# Patient Record
Sex: FEMALE | Race: WHITE | Employment: OTHER | URBAN - METROPOLITAN AREA
[De-identification: names, ages, dates, MRNs, and addresses within clinical notes are randomized per-mention and may not be internally consistent; named-entity substitution may affect disease eponyms.]

---

## 2022-08-04 ENCOUNTER — TRANSCRIBE ORDER (OUTPATIENT)
Dept: SCHEDULING | Age: 82
End: 2022-08-04

## 2022-08-04 DIAGNOSIS — M81.0 SENILE OSTEOPOROSIS: Primary | ICD-10-CM

## 2022-08-04 DIAGNOSIS — F17.210 NICOTINE DEPENDENCE, CIGARETTES, UNCOMPLICATED: ICD-10-CM

## 2022-08-04 DIAGNOSIS — R63.4 ABNORMAL WEIGHT LOSS: ICD-10-CM

## 2022-08-04 DIAGNOSIS — M81.0 AGE-RELATED OSTEOPOROSIS WITHOUT CURRENT PATHOLOGICAL FRACTURE: ICD-10-CM

## 2022-08-04 DIAGNOSIS — C50.812 MALIGNANT NEOPLASM OF OVERLAPPING SITES OF LEFT FEMALE BREAST (HCC): ICD-10-CM

## 2022-08-09 ENCOUNTER — HOSPITAL ENCOUNTER (OUTPATIENT)
Dept: CT IMAGING | Age: 82
Discharge: HOME | End: 2022-08-09
Payer: MEDICARE

## 2022-08-09 ENCOUNTER — HOSPITAL ENCOUNTER (OUTPATIENT)
Dept: MAMMOGRAPHY | Age: 82
Discharge: HOME | End: 2022-08-09
Payer: MEDICARE

## 2022-08-09 DIAGNOSIS — C50.812 MALIGNANT NEOPLASM OF OVERLAPPING SITES OF LEFT FEMALE BREAST (HCC): ICD-10-CM

## 2022-08-09 DIAGNOSIS — R63.4 ABNORMAL WEIGHT LOSS: ICD-10-CM

## 2022-08-09 DIAGNOSIS — F17.210 NICOTINE DEPENDENCE, CIGARETTES, UNCOMPLICATED: ICD-10-CM

## 2022-08-09 DIAGNOSIS — M81.0 AGE-RELATED OSTEOPOROSIS WITHOUT CURRENT PATHOLOGICAL FRACTURE: ICD-10-CM

## 2022-08-09 PROCEDURE — 77080 DXA BONE DENSITY AXIAL: CPT

## 2022-08-09 PROCEDURE — 71250 CT THORAX DX C-: CPT

## 2023-04-06 ENCOUNTER — OFFICE VISIT (OUTPATIENT)
Facility: CLINIC | Age: 83
End: 2023-04-06
Payer: MEDICARE

## 2023-04-06 VITALS
HEART RATE: 71 BPM | RESPIRATION RATE: 15 BRPM | DIASTOLIC BLOOD PRESSURE: 72 MMHG | SYSTOLIC BLOOD PRESSURE: 160 MMHG | WEIGHT: 103 LBS | TEMPERATURE: 97.9 F | OXYGEN SATURATION: 96 %

## 2023-04-06 DIAGNOSIS — Z72.0 TOBACCO USE: ICD-10-CM

## 2023-04-06 DIAGNOSIS — I10 PRIMARY HYPERTENSION: Primary | ICD-10-CM

## 2023-04-06 DIAGNOSIS — E03.9 HYPOTHYROIDISM, UNSPECIFIED TYPE: ICD-10-CM

## 2023-04-06 DIAGNOSIS — Z86.79 HX OF CHRONIC CONGESTIVE HEART FAILURE: ICD-10-CM

## 2023-04-06 DIAGNOSIS — I73.9 PERIPHERAL VASCULAR DISEASE (HCC): ICD-10-CM

## 2023-04-06 DIAGNOSIS — Z13.220 SCREENING FOR CHOLESTEROL LEVEL: ICD-10-CM

## 2023-04-06 PROCEDURE — 1123F ACP DISCUSS/DSCN MKR DOCD: CPT | Performed by: NURSE PRACTITIONER

## 2023-04-06 PROCEDURE — 99203 OFFICE O/P NEW LOW 30 MIN: CPT | Performed by: NURSE PRACTITIONER

## 2023-04-06 PROCEDURE — 4004F PT TOBACCO SCREEN RCVD TLK: CPT | Performed by: NURSE PRACTITIONER

## 2023-04-06 PROCEDURE — G8399 PT W/DXA RESULTS DOCUMENT: HCPCS | Performed by: NURSE PRACTITIONER

## 2023-04-06 PROCEDURE — G8421 BMI NOT CALCULATED: HCPCS | Performed by: NURSE PRACTITIONER

## 2023-04-06 PROCEDURE — 3077F SYST BP >= 140 MM HG: CPT | Performed by: NURSE PRACTITIONER

## 2023-04-06 PROCEDURE — 3078F DIAST BP <80 MM HG: CPT | Performed by: NURSE PRACTITIONER

## 2023-04-06 PROCEDURE — G8427 DOCREV CUR MEDS BY ELIG CLIN: HCPCS | Performed by: NURSE PRACTITIONER

## 2023-04-06 PROCEDURE — 1090F PRES/ABSN URINE INCON ASSESS: CPT | Performed by: NURSE PRACTITIONER

## 2023-04-06 RX ORDER — METOPROLOL SUCCINATE 25 MG/1
25 TABLET, EXTENDED RELEASE ORAL DAILY
COMMUNITY
Start: 2023-01-24

## 2023-04-06 RX ORDER — LEVOTHYROXINE SODIUM 0.03 MG/1
25 TABLET ORAL DAILY
COMMUNITY
Start: 2023-03-21

## 2023-04-06 RX ORDER — LISINOPRIL 10 MG/1
10 TABLET ORAL DAILY
COMMUNITY
Start: 2023-02-19 | End: 2023-04-06 | Stop reason: SDUPTHER

## 2023-04-06 RX ORDER — TAMOXIFEN CITRATE 20 MG/1
TABLET ORAL
COMMUNITY
Start: 2023-03-21

## 2023-04-06 RX ORDER — OXYBUTYNIN CHLORIDE 5 MG/1
2.5 TABLET ORAL DAILY
COMMUNITY
Start: 2023-03-21

## 2023-04-06 RX ORDER — LISINOPRIL 20 MG/1
20 TABLET ORAL DAILY
Qty: 90 TABLET | Refills: 0 | Status: SHIPPED | OUTPATIENT
Start: 2023-04-06 | End: 2023-07-05

## 2023-04-06 SDOH — ECONOMIC STABILITY: FOOD INSECURITY: WITHIN THE PAST 12 MONTHS, THE FOOD YOU BOUGHT JUST DIDN'T LAST AND YOU DIDN'T HAVE MONEY TO GET MORE.: NEVER TRUE

## 2023-04-06 SDOH — ECONOMIC STABILITY: INCOME INSECURITY: HOW HARD IS IT FOR YOU TO PAY FOR THE VERY BASICS LIKE FOOD, HOUSING, MEDICAL CARE, AND HEATING?: NOT HARD AT ALL

## 2023-04-06 SDOH — ECONOMIC STABILITY: HOUSING INSECURITY
IN THE LAST 12 MONTHS, WAS THERE A TIME WHEN YOU DID NOT HAVE A STEADY PLACE TO SLEEP OR SLEPT IN A SHELTER (INCLUDING NOW)?: NO

## 2023-04-06 SDOH — ECONOMIC STABILITY: FOOD INSECURITY: WITHIN THE PAST 12 MONTHS, YOU WORRIED THAT YOUR FOOD WOULD RUN OUT BEFORE YOU GOT MONEY TO BUY MORE.: NEVER TRUE

## 2023-04-06 ASSESSMENT — PATIENT HEALTH QUESTIONNAIRE - PHQ9
SUM OF ALL RESPONSES TO PHQ QUESTIONS 1-9: 0
SUM OF ALL RESPONSES TO PHQ9 QUESTIONS 1 & 2: 0
1. LITTLE INTEREST OR PLEASURE IN DOING THINGS: 0
SUM OF ALL RESPONSES TO PHQ QUESTIONS 1-9: 0
2. FEELING DOWN, DEPRESSED OR HOPELESS: 0

## 2023-04-06 NOTE — PROGRESS NOTES
Nancy Mccracken presents today for   Chief Complaint   Patient presents with    New Patient     New patient establish care with provider        Is someone accompanying this pt? Yes daughter    Is the patient using any DME equipment during OV? Yes walker     Health Maintenance reviewed and discussed and ordered per Provider. Health Maintenance Due   Topic Date Due    COVID-19 Vaccine (1) Never done    Depression Screen  Never done    DTaP/Tdap/Td vaccine (1 - Tdap) Never done    Shingles vaccine (1 of 2) Never done    Pneumococcal 65+ years Vaccine (1 - PCV) Never done    Annual Wellness Visit (AWV)  03/15/2023   . 1. \"Have you been to the ER, urgent care clinic since your last visit? Hospitalized since your last visit? \" No    2. \"Have you seen or consulted any other health care providers outside of the [address] [place] [address] since your last visit? \" Yes 3/2023 [place]     3. For patients aged 39-70: Has the patient had a colonoscopy / FIT/ Cologuard? NA - based on age      If the patient is female:    4. For patients aged 41-77: Has the patient had a mammogram within the past 2 years? NA - based on age or sex      11. For patients aged 21-65: Has the patient had a pap smear?  NA - based on age or sex
for rash  Neurological:  Negative for fainting,dizziness, weakness, headaches,numbness       Current Outpatient Medications   Medication Sig    levothyroxine (SYNTHROID) 25 MCG tablet Take 1 tablet by mouth daily    metoprolol succinate (TOPROL XL) 25 MG extended release tablet Take 1 tablet by mouth daily    oxybutynin (DITROPAN) 5 MG tablet Take 0.5 tablets by mouth daily    tamoxifen (NOLVADEX) 20 MG tablet TAKE 1 TABLET BY MOUTH DAILY. SWALLOW WHOLE WITH WATER    lisinopril (PRINIVIL;ZESTRIL) 20 MG tablet Take 1 tablet by mouth daily     No current facility-administered medications for this visit. BP (!) 160/72   Pulse 71   Temp 97.9 °F (36.6 °C)   Resp 15   Wt 103 lb (46.7 kg)   SpO2 96%            General:  alert, cooperative, well appearing, in no apparent distress. Eyes:  Pupils are equally round and reactive to light with accommodation. ENT:    The tongue and mucous membranes are pink and moist without lesions. The pharynx is non-erythematous without exudates. Neck:  There is normal range of motion. The thyroid is normal size without nodules or masses. There is no lymphadenopathy. CV:  The heart sounds are regular in rate and rhythm. There is a normal S1 and S2. Heart  sounds are distant   Lungs: Inspiratory and expiratory efforts are patient and unlabored. Lung sounds are  diminished and equal to auscultation throughout all lung fields without wheezing, rales, or rhonchi. Extremities: There is no clubbing, there is skin color changes noted to the bilateral feet both feet are equally cool to touch with purplish hue and capillary refill greater than 3 seconds. ,  There is no edema. Pulses are palpable. Neurological:    There is no obvious focal sensory or motor deficits. An electronic signature was used to authenticate this note.   -- TRISHA Negro

## 2023-08-17 ENCOUNTER — OFFICE VISIT (OUTPATIENT)
Facility: CLINIC | Age: 83
End: 2023-08-17
Payer: MEDICARE

## 2023-08-17 VITALS
WEIGHT: 105 LBS | RESPIRATION RATE: 14 BRPM | TEMPERATURE: 98.1 F | HEART RATE: 75 BPM | OXYGEN SATURATION: 98 % | SYSTOLIC BLOOD PRESSURE: 183 MMHG | DIASTOLIC BLOOD PRESSURE: 100 MMHG

## 2023-08-17 DIAGNOSIS — M79.641 PAIN IN BOTH HANDS: ICD-10-CM

## 2023-08-17 DIAGNOSIS — M79.642 PAIN IN BOTH HANDS: ICD-10-CM

## 2023-08-17 DIAGNOSIS — M25.541 ARTHRALGIA OF HANDS, BILATERAL: Primary | ICD-10-CM

## 2023-08-17 DIAGNOSIS — M25.542 ARTHRALGIA OF HANDS, BILATERAL: Primary | ICD-10-CM

## 2023-08-17 DIAGNOSIS — M24.541 CONTRACTURE OF JOINT OF FINGER OF RIGHT HAND: ICD-10-CM

## 2023-08-17 PROBLEM — C50.919 PRIMARY MALIGNANT NEOPLASM OF FEMALE BREAST (HCC): Status: ACTIVE | Noted: 2023-08-17

## 2023-08-17 PROBLEM — I10 HYPERTENSION: Status: ACTIVE | Noted: 2023-08-17

## 2023-08-17 PROBLEM — R63.4 ABNORMAL WEIGHT LOSS: Status: ACTIVE | Noted: 2023-08-17

## 2023-08-17 PROBLEM — T50.905A HOT FLASH DUE TO MEDICATION: Status: ACTIVE | Noted: 2023-08-17

## 2023-08-17 PROBLEM — R23.2 HOT FLASH DUE TO MEDICATION: Status: ACTIVE | Noted: 2023-08-17

## 2023-08-17 PROBLEM — M81.0 OSTEOPOROSIS: Status: ACTIVE | Noted: 2023-08-17

## 2023-08-17 PROCEDURE — G8399 PT W/DXA RESULTS DOCUMENT: HCPCS | Performed by: NURSE PRACTITIONER

## 2023-08-17 PROCEDURE — G8421 BMI NOT CALCULATED: HCPCS | Performed by: NURSE PRACTITIONER

## 2023-08-17 PROCEDURE — 3077F SYST BP >= 140 MM HG: CPT | Performed by: NURSE PRACTITIONER

## 2023-08-17 PROCEDURE — 4004F PT TOBACCO SCREEN RCVD TLK: CPT | Performed by: NURSE PRACTITIONER

## 2023-08-17 PROCEDURE — 1123F ACP DISCUSS/DSCN MKR DOCD: CPT | Performed by: NURSE PRACTITIONER

## 2023-08-17 PROCEDURE — 3080F DIAST BP >= 90 MM HG: CPT | Performed by: NURSE PRACTITIONER

## 2023-08-17 PROCEDURE — 1090F PRES/ABSN URINE INCON ASSESS: CPT | Performed by: NURSE PRACTITIONER

## 2023-08-17 PROCEDURE — 99213 OFFICE O/P EST LOW 20 MIN: CPT | Performed by: NURSE PRACTITIONER

## 2023-08-17 PROCEDURE — G8427 DOCREV CUR MEDS BY ELIG CLIN: HCPCS | Performed by: NURSE PRACTITIONER

## 2023-08-17 SDOH — ECONOMIC STABILITY: FOOD INSECURITY: WITHIN THE PAST 12 MONTHS, YOU WORRIED THAT YOUR FOOD WOULD RUN OUT BEFORE YOU GOT MONEY TO BUY MORE.: NEVER TRUE

## 2023-08-17 SDOH — ECONOMIC STABILITY: FOOD INSECURITY: WITHIN THE PAST 12 MONTHS, THE FOOD YOU BOUGHT JUST DIDN'T LAST AND YOU DIDN'T HAVE MONEY TO GET MORE.: NEVER TRUE

## 2023-08-17 SDOH — ECONOMIC STABILITY: INCOME INSECURITY: HOW HARD IS IT FOR YOU TO PAY FOR THE VERY BASICS LIKE FOOD, HOUSING, MEDICAL CARE, AND HEATING?: NOT HARD AT ALL

## 2023-08-17 ASSESSMENT — PATIENT HEALTH QUESTIONNAIRE - PHQ9
2. FEELING DOWN, DEPRESSED OR HOPELESS: 0
SUM OF ALL RESPONSES TO PHQ QUESTIONS 1-9: 0
1. LITTLE INTEREST OR PLEASURE IN DOING THINGS: 0
SUM OF ALL RESPONSES TO PHQ QUESTIONS 1-9: 0
SUM OF ALL RESPONSES TO PHQ9 QUESTIONS 1 & 2: 0
SUM OF ALL RESPONSES TO PHQ QUESTIONS 1-9: 0
SUM OF ALL RESPONSES TO PHQ QUESTIONS 1-9: 0

## 2023-08-17 NOTE — PROGRESS NOTES
Maribel Andreia presents today for   Chief Complaint   Patient presents with    Hand Pain     Has some hand pain and swelling        Is someone accompanying this pt? Yes her daughter     Is the patient using any DME equipment during OV? Yes walker     Health Maintenance reviewed and discussed and ordered per Provider. Health Maintenance Due   Topic Date Due    COVID-19 Vaccine (1) Never done    Pneumococcal 65+ years Vaccine (1 - PCV) Never done    DTaP/Tdap/Td vaccine (1 - Tdap) Never done    Shingles vaccine (1 of 2) Never done    Low dose CT lung screening &/or counseling  Never done    Annual Wellness Visit (AWV)  Never done    Flu vaccine (1) Never done   . 1. \"Have you been to the ER, urgent care clinic since your last visit? Hospitalized since your last visit? \" No    2. \"Have you seen or consulted any other health care providers outside of the [address] [place] [address] since your last visit? \" Yes When: 5/2023 Where: Oncology  Reason for visit: follow up visit       3. For patients aged 43-73: Has the patient had a colonoscopy / FIT/ Cologuard? NA - based on age      If the patient is female:    4. For patients aged 43-66: Has the patient had a mammogram within the past 2 years? NA - based on age or sex      11. For patients aged 21-65: Has the patient had a pap smear?  NA - based on age or sex
Patient states when she was 34years old she had a severe left hand injury where her left hand was crushed in a press machine. She suffered fractures, had surgeries, she has her second left finger amputated, she is not able to do much or use this left hand and this is started when she was 34years old. Patient was left-handed at the time she states that she has learned to use her right hand. Patient's right hand is been bothering her she states she uses it a lot and has to take up the slack due to the left hands inability, but she says that the right hand has been intermittently swollen, it cramps, and gets severely painful. Also noted that last week the left hand had swelling hot and redness to it which is now dissipated  Did have lab work and they are going to get this med labs to be so I can evaluate  Redness and swelling of calm down but the patient is worried about the pain, she uses Aspercreme as needed. Wondering if there is any other topicals that could benefit  Consider seeing a specialist to see if there is any options such as injections. Noted the left fifth finger is amputated also states she had an extreme infection in this when she had hand surgery  Patient does continue to smoke cigarettes  No report today of fever, chills, visual disturbances, cough or shortness of breath, chest pain or abdominal pain, nausea or vomiting, dizziness or weakness or fainting. ROS:  Constitutional: Negative for fever, chills ,fatigue, unexplained weight gain/loss  HENT:  Negative for ear pain,postnasal drip, rhinitis,  Eyes:  Negative for visual disturbance,   Respiratory:  Negative for cough and shortness of breath,wheezing ,congestion  Cardiovascular:  Negative for chest pain, palpitations and leg swelling. Gastrointestinal:  Negative for abdominal pain, constipation, diarrhea, nausea ,vomiting. Musculoskeletal:  Negative for arthralgias, back pain and gait problem.  Joint pain, muscle

## 2023-09-28 ENCOUNTER — OFFICE VISIT (OUTPATIENT)
Facility: CLINIC | Age: 83
End: 2023-09-28
Payer: MEDICARE

## 2023-09-28 ENCOUNTER — NURSE TRIAGE (OUTPATIENT)
Dept: OTHER | Facility: CLINIC | Age: 83
End: 2023-09-28

## 2023-09-28 VITALS
OXYGEN SATURATION: 97 % | SYSTOLIC BLOOD PRESSURE: 144 MMHG | TEMPERATURE: 97.7 F | RESPIRATION RATE: 16 BRPM | HEART RATE: 80 BPM | DIASTOLIC BLOOD PRESSURE: 83 MMHG

## 2023-09-28 DIAGNOSIS — C50.919 PRIMARY MALIGNANT NEOPLASM OF FEMALE BREAST (HCC): ICD-10-CM

## 2023-09-28 DIAGNOSIS — I73.9 PERIPHERAL VASCULAR DISEASE (HCC): ICD-10-CM

## 2023-09-28 DIAGNOSIS — J20.9 ACUTE BRONCHITIS, UNSPECIFIED ORGANISM: Primary | ICD-10-CM

## 2023-09-28 DIAGNOSIS — I10 PRIMARY HYPERTENSION: ICD-10-CM

## 2023-09-28 DIAGNOSIS — Z72.0 TOBACCO USE: ICD-10-CM

## 2023-09-28 PROBLEM — M81.8 OTHER OSTEOPOROSIS WITHOUT CURRENT PATHOLOGICAL FRACTURE: Status: ACTIVE | Noted: 2023-09-28

## 2023-09-28 PROCEDURE — G8399 PT W/DXA RESULTS DOCUMENT: HCPCS | Performed by: NURSE PRACTITIONER

## 2023-09-28 PROCEDURE — 1123F ACP DISCUSS/DSCN MKR DOCD: CPT | Performed by: NURSE PRACTITIONER

## 2023-09-28 PROCEDURE — 1090F PRES/ABSN URINE INCON ASSESS: CPT | Performed by: NURSE PRACTITIONER

## 2023-09-28 PROCEDURE — G8427 DOCREV CUR MEDS BY ELIG CLIN: HCPCS | Performed by: NURSE PRACTITIONER

## 2023-09-28 PROCEDURE — 99213 OFFICE O/P EST LOW 20 MIN: CPT | Performed by: NURSE PRACTITIONER

## 2023-09-28 PROCEDURE — 4004F PT TOBACCO SCREEN RCVD TLK: CPT | Performed by: NURSE PRACTITIONER

## 2023-09-28 PROCEDURE — G8421 BMI NOT CALCULATED: HCPCS | Performed by: NURSE PRACTITIONER

## 2023-09-28 PROCEDURE — 3079F DIAST BP 80-89 MM HG: CPT | Performed by: NURSE PRACTITIONER

## 2023-09-28 PROCEDURE — 3077F SYST BP >= 140 MM HG: CPT | Performed by: NURSE PRACTITIONER

## 2023-09-28 RX ORDER — DOXYCYCLINE HYCLATE 100 MG
100 TABLET ORAL 2 TIMES DAILY
Qty: 14 TABLET | Refills: 0 | Status: SHIPPED | OUTPATIENT
Start: 2023-09-28 | End: 2023-10-05

## 2023-09-28 ASSESSMENT — PATIENT HEALTH QUESTIONNAIRE - PHQ9
SUM OF ALL RESPONSES TO PHQ9 QUESTIONS 1 & 2: 0
SUM OF ALL RESPONSES TO PHQ QUESTIONS 1-9: 0
SUM OF ALL RESPONSES TO PHQ QUESTIONS 1-9: 0
1. LITTLE INTEREST OR PLEASURE IN DOING THINGS: 0
SUM OF ALL RESPONSES TO PHQ QUESTIONS 1-9: 0
2. FEELING DOWN, DEPRESSED OR HOPELESS: 0
SUM OF ALL RESPONSES TO PHQ QUESTIONS 1-9: 0

## 2023-09-28 NOTE — TELEPHONE ENCOUNTER
[facility]    Received call from [facility] at [facility] with [employer]. Subjective: Caller states \"About a week ago she was feeling really weak. Maybe a little loss of appetite. Right now her symptoms are she's coughing more, it's in her chest. Her blood pressure has been high or low. I don't see a temperature. She said she was dizzy, lightheaded, fatigue. Her pupils look dilated to me. She says she feels a little bit better than yesterday. \" \"I called yesterday and they set her up an appointment for this afternoon, I just wanted to move it to 1130. \"    Current Symptoms: dizziness-NOT now, cough, chest congestion-improving/chest soreness with coughing ONLY, SOB with coughing-NO difficulty breathing, fatigue, pupils dilated    Hx of CHF, COPD    Hx of Pneumonia and similar symptoms    Onset: 1 week ago; waxing and waning    Associated Symptoms: reduced activity    Pain Severity: Denies    Temperature: Denies    What has been tried: Nothing    LMP: NA Pregnant: NA    Recommended disposition: See in Office Today. Caller agreeable. Care advice provided, patient verbalizes understanding; denies any other questions or concerns; instructed to call back for any new or worsening symptoms. Patient/Caller agrees with recommended disposition; writer provided warm transfer to [employer] at [facility] for appointment scheduling. Attention Provider: Thank you for allowing me to participate in the care of your patient. The patient was connected to triage in response to information provided to the ECC/PSC. Please do not respond through this encounter as the response is not directed to a shared pool.       Reason for Disposition   Known COPD or other severe lung disease (i.e., bronchiectasis, cystic fibrosis, lung surgery) and worsening symptoms (i.e., increased sputum purulence or amount, increased breathing difficulty)    Protocols used: Cough-ADULT-OH

## 2023-09-28 NOTE — PROGRESS NOTES
Dustin Cobos presents today for   Chief Complaint   Patient presents with    Cough     Has a cough and congestion for about a week now no fever BP been up and down has been weak tired lightheadedness        Is someone accompanying this pt? Yes her daughter     Is the patient using any DME equipment during OV? Yes walker    Health Maintenance reviewed and discussed and ordered per Provider. Health Maintenance Due   Topic Date Due    COVID-19 Vaccine (1) Never done    Pneumococcal 65+ years Vaccine (1 - PCV) Never done    DTaP/Tdap/Td vaccine (1 - Tdap) Never done    Shingles vaccine (1 of 2) Never done    Annual Wellness Visit (AWV)  Never done    Flu vaccine (1) Never done   . 1. \"Have you been to the ER, urgent care clinic since your last visit? Hospitalized since your last visit? \" No    2. \"Have you seen or consulted any other health care providers outside of the [address] [place] [address] since your last visit? \" No     3. For patients aged 43-73: Has the patient had a colonoscopy / FIT/ Cologuard? NA - based on age      If the patient is female:    4. For patients aged 43-66: Has the patient had a mammogram within the past 2 years? NA - based on age or sex      11. For patients aged 21-65: Has the patient had a pap smear?  NA - based on age or sex

## 2023-09-28 NOTE — PROGRESS NOTES
Maribel Boswell (: 1940) is a 82 y.o. female patient, here for evaluation of the following chief complaint(s):  Cough (Has a cough and congestion for about a week now no fever BP been up and down has been weak tired lightheadedness )       ASSESSMENT/PLAN:  Below is the assessment and plan developed based on review of pertinent history, physical exam, labs, studies, and medications. #1: Antibiotic as prescribed  Chest x-ray  Patient may use over-the-counter medication such as Mucinex with guaifenesin. Go to the emergency room for any severe symptoms such as shortness of breath  Blood pressure is mildly mildly elevated in the office today. Patient has not smoked yesterday, will continue to smoke not ready to quit. #5: [address] code. No changes in the perforations apparent peripheral vascular disease upon exam the patient does not want to follow with specialty care we will continue to monitor closely    Follow-up in the office for any new or worsening symptoms ER for severe symptoms    1. Acute bronchitis, unspecified organism  -     doxycycline hyclate (VIBRA-TABS) 100 MG tablet; Take 1 tablet by mouth 2 times daily for 7 days, Disp-14 tablet, R-0Normal  -     XR CHEST (2 VIEWS); Future  2. Primary malignant neoplasm of female breast ([address])  3. Primary hypertension  4. Tobacco use  5. Peripheral vascular disease ([address])    No results found for any visits on 23. Return in about 1 week (around 10/5/2023). I have discussed the diagnosis with the patient and the intended plan as seen in the above orders. Questions were answered concerning future plans. I have discussed medication side effects and warnings with the patient as well. I have reviewed the plan of care with the patient, accepted their input and they are in agreement with the treatment goals. Previous lab and imaging results were reviewed by me.      I have performed a medically appropriate exam, ordering tests and medications, counseling

## 2023-09-29 ENCOUNTER — TELEPHONE (OUTPATIENT)
Facility: CLINIC | Age: 83
End: 2023-09-29

## 2023-09-29 ENCOUNTER — HOSPITAL ENCOUNTER (OUTPATIENT)
Age: 83
End: 2023-09-29
Payer: MEDICARE

## 2023-09-29 DIAGNOSIS — J20.9 ACUTE BRONCHITIS, UNSPECIFIED ORGANISM: ICD-10-CM

## 2023-09-29 PROCEDURE — 71046 X-RAY EXAM CHEST 2 VIEWS: CPT

## 2023-10-02 ENCOUNTER — TELEPHONE (OUTPATIENT)
Facility: CLINIC | Age: 83
End: 2023-10-02

## 2023-10-02 NOTE — TELEPHONE ENCOUNTER
Daughter called again about x ray results. Guanakito Robersoned also that she hasn't been able to take antibiotic because it has been upsetting he stomach. Please advise.

## 2023-10-02 NOTE — TELEPHONE ENCOUNTER
Called and notified patient's daughter Padmini Bass of patient [place] [facility] results she understood no further questions nor concerns

## 2024-01-24 DIAGNOSIS — I10 PRIMARY HYPERTENSION: ICD-10-CM

## 2024-01-24 NOTE — TELEPHONE ENCOUNTER
Last visit 9/28/23, no scheduled follow up.  Per [facility] refill protocol, no creatinine or potassium levels checked in past 12 months.

## 2024-01-25 DIAGNOSIS — I10 PRIMARY HYPERTENSION: ICD-10-CM

## 2024-01-25 RX ORDER — LISINOPRIL 20 MG/1
20 TABLET ORAL DAILY
Qty: 30 TABLET | Refills: 0 | Status: SHIPPED | OUTPATIENT
Start: 2024-01-25 | End: 2024-02-24

## 2024-01-25 RX ORDER — LISINOPRIL 20 MG/1
20 TABLET ORAL DAILY
Qty: 90 TABLET | OUTPATIENT
Start: 2024-01-25 | End: 2024-02-24

## 2024-03-06 DIAGNOSIS — I10 PRIMARY HYPERTENSION: ICD-10-CM

## 2024-03-07 DIAGNOSIS — I10 PRIMARY HYPERTENSION: ICD-10-CM

## 2024-03-07 RX ORDER — LISINOPRIL 20 MG/1
20 TABLET ORAL DAILY
Qty: 30 TABLET | Refills: 0 | Status: SHIPPED | OUTPATIENT
Start: 2024-03-07 | End: 2024-04-06

## 2024-03-07 RX ORDER — LISINOPRIL 20 MG/1
20 TABLET ORAL DAILY
Qty: 90 TABLET | OUTPATIENT
Start: 2024-03-07 | End: 2024-04-06

## 2024-03-07 NOTE — TELEPHONE ENCOUNTER
Last appointment was 9/28/2023 and she has no follow up scheduled. Please advise on continued refills, when would you like patient to follow up. Thank You.

## 2024-03-07 NOTE — TELEPHONE ENCOUNTER
Spoke with daughter and she states that about 1 week ago had food poisoning, vomiting and diarrhea for like 2 or 3 days. She was weak and daughter was giving her bland foods. Last night she said patient was constipated and couldn't poop so she took some stool softener medication and had a bowel movement. She has been eating a little today. So at this time back pain and weakness, stomach is feeling \"funny\". Spoke to patient and she states that she is at this time she has no appetite and is feeling weak and tired and her stomach stays bloated all the time, back pain up in between her shoulder blades, was having some neck pain. No urinary problems, no fevers. States she gets hot and cold. States she just doesn't feel right since she got food poisoning. No energy. Scheduled patient for tomorrow at 11 AM

## 2024-03-22 ENCOUNTER — HOSPITAL ENCOUNTER (EMERGENCY)
Age: 84
Discharge: HOME | End: 2024-03-22
Attending: EMERGENCY MEDICINE
Payer: MEDICARE

## 2024-03-22 ENCOUNTER — APPOINTMENT (OUTPATIENT)
Age: 84
End: 2024-03-22
Payer: MEDICARE

## 2024-03-22 VITALS
HEART RATE: 68 BPM | HEIGHT: 68 IN | RESPIRATION RATE: 18 BRPM | BODY MASS INDEX: 15.91 KG/M2 | OXYGEN SATURATION: 97 % | DIASTOLIC BLOOD PRESSURE: 89 MMHG | SYSTOLIC BLOOD PRESSURE: 176 MMHG | TEMPERATURE: 97.7 F | WEIGHT: 105 LBS

## 2024-03-22 DIAGNOSIS — T17.908A ASPIRATION INTO AIRWAY, INITIAL ENCOUNTER: ICD-10-CM

## 2024-03-22 DIAGNOSIS — R19.7 NAUSEA VOMITING AND DIARRHEA: Primary | ICD-10-CM

## 2024-03-22 DIAGNOSIS — R11.2 NAUSEA VOMITING AND DIARRHEA: Primary | ICD-10-CM

## 2024-03-22 LAB
ALBUMIN SERPL-MCNC: 3.3 G/DL (ref 3.4–5)
ALBUMIN/GLOB SERPL: 0.8 (ref 0.8–1.7)
ALP SERPL-CCNC: 128 U/L (ref 45–117)
ALT SERPL-CCNC: 11 U/L (ref 13–56)
ANION GAP SERPL CALC-SCNC: 9 MMOL/L (ref 3–18)
AST SERPL W P-5'-P-CCNC: 18 U/L (ref 10–38)
BASOPHILS # BLD: 0 K/UL (ref 0–0.1)
BASOPHILS NFR BLD: 1 % (ref 0–2)
BILIRUB SERPL-MCNC: 0.7 MG/DL (ref 0.2–1)
BUN SERPL-MCNC: 12 MG/DL (ref 7–18)
BUN/CREAT SERPL: 11 (ref 12–20)
CA-I BLD-MCNC: 9.5 MG/DL (ref 8.5–10.1)
CHLORIDE SERPL-SCNC: 102 MMOL/L (ref 100–111)
CO2 SERPL-SCNC: 24 MMOL/L (ref 21–32)
CREAT SERPL-MCNC: 1.05 MG/DL (ref 0.6–1.3)
DIFFERENTIAL METHOD BLD: ABNORMAL
EOSINOPHIL # BLD: 0.1 K/UL (ref 0–0.4)
EOSINOPHIL NFR BLD: 1 % (ref 0–5)
ERYTHROCYTE [DISTWIDTH] IN BLOOD BY AUTOMATED COUNT: 13.1 % (ref 11.6–14.5)
FLUAV AG NPH QL IA: NEGATIVE
FLUBV AG NOSE QL IA: NEGATIVE
GLOBULIN SER CALC-MCNC: 4.1 G/DL (ref 2–4)
GLUCOSE SERPL-MCNC: 86 MG/DL (ref 74–99)
HCT VFR BLD AUTO: 44 % (ref 35–45)
HGB BLD-MCNC: 15.2 G/DL (ref 12–16)
IMM GRANULOCYTES # BLD AUTO: 0 K/UL (ref 0–0.04)
IMM GRANULOCYTES NFR BLD AUTO: 1 % (ref 0–0.5)
LIPASE SERPL-CCNC: 24 U/L (ref 13–75)
LYMPHOCYTES # BLD: 1.4 K/UL (ref 0.9–3.6)
LYMPHOCYTES NFR BLD: 22 % (ref 21–52)
MCH RBC QN AUTO: 33.1 PG (ref 24–34)
MCHC RBC AUTO-ENTMCNC: 34.5 G/DL (ref 31–37)
MCV RBC AUTO: 95.9 FL (ref 78–100)
MONOCYTES # BLD: 0.7 K/UL (ref 0.05–1.2)
MONOCYTES NFR BLD: 10 % (ref 3–10)
NEUTS SEG # BLD: 4.1 K/UL (ref 1.8–8)
NEUTS SEG NFR BLD: 65 % (ref 40–73)
NRBC # BLD: 0 K/UL (ref 0–0.01)
NRBC BLD-RTO: 0 PER 100 WBC
PLATELET # BLD AUTO: 247 K/UL (ref 135–420)
PMV BLD AUTO: 11.3 FL (ref 9.2–11.8)
POTASSIUM SERPL-SCNC: 4.4 MMOL/L (ref 3.5–5.5)
PROT SERPL-MCNC: 7.4 G/DL (ref 6.4–8.2)
RBC # BLD AUTO: 4.59 M/UL (ref 4.2–5.3)
SARS-COV-2 RDRP RESP QL NAA+PROBE: NOT DETECTED
SODIUM SERPL-SCNC: 135 MMOL/L (ref 136–145)
WBC # BLD AUTO: 6.3 K/UL (ref 4.6–13.2)

## 2024-03-22 PROCEDURE — 99285 EMERGENCY DEPT VISIT HI MDM: CPT

## 2024-03-22 PROCEDURE — 87635 SARS-COV-2 COVID-19 AMP PRB: CPT

## 2024-03-22 PROCEDURE — 6360000004 HC RX CONTRAST MEDICATION: Performed by: EMERGENCY MEDICINE

## 2024-03-22 PROCEDURE — 96361 HYDRATE IV INFUSION ADD-ON: CPT

## 2024-03-22 PROCEDURE — 93005 ELECTROCARDIOGRAM TRACING: CPT | Performed by: EMERGENCY MEDICINE

## 2024-03-22 PROCEDURE — 74177 CT ABD & PELVIS W/CONTRAST: CPT

## 2024-03-22 PROCEDURE — 96374 THER/PROPH/DIAG INJ IV PUSH: CPT

## 2024-03-22 PROCEDURE — 87804 INFLUENZA ASSAY W/OPTIC: CPT

## 2024-03-22 PROCEDURE — 85025 COMPLETE CBC W/AUTO DIFF WBC: CPT

## 2024-03-22 PROCEDURE — 2580000003 HC RX 258: Performed by: EMERGENCY MEDICINE

## 2024-03-22 PROCEDURE — 6360000002 HC RX W HCPCS: Performed by: EMERGENCY MEDICINE

## 2024-03-22 PROCEDURE — 83690 ASSAY OF LIPASE: CPT

## 2024-03-22 PROCEDURE — 80053 COMPREHEN METABOLIC PANEL: CPT

## 2024-03-22 RX ORDER — ONDANSETRON 4 MG/1
4 TABLET, ORALLY DISINTEGRATING ORAL 3 TIMES DAILY PRN
Qty: 21 TABLET | Refills: 0 | Status: SHIPPED | OUTPATIENT
Start: 2024-03-22

## 2024-03-22 RX ORDER — DIPHENOXYLATE HYDROCHLORIDE AND ATROPINE SULFATE 2.5; .025 MG/1; MG/1
1 TABLET ORAL 4 TIMES DAILY PRN
Qty: 12 TABLET | Refills: 0 | Status: SHIPPED | OUTPATIENT
Start: 2024-03-22 | End: 2024-04-01

## 2024-03-22 RX ORDER — 0.9 % SODIUM CHLORIDE 0.9 %
1000 INTRAVENOUS SOLUTION INTRAVENOUS ONCE
Status: COMPLETED | OUTPATIENT
Start: 2024-03-22 | End: 2024-03-22

## 2024-03-22 RX ORDER — CLONIDINE HYDROCHLORIDE 0.1 MG/1
0.1 TABLET ORAL EVERY EVENING
COMMUNITY
Start: 2024-02-21

## 2024-03-22 RX ORDER — LEVOFLOXACIN 500 MG/1
500 TABLET, FILM COATED ORAL DAILY
Qty: 5 TABLET | Refills: 0 | Status: SHIPPED | OUTPATIENT
Start: 2024-03-22 | End: 2024-03-29

## 2024-03-22 RX ORDER — ONDANSETRON 2 MG/ML
4 INJECTION INTRAMUSCULAR; INTRAVENOUS ONCE
Status: COMPLETED | OUTPATIENT
Start: 2024-03-22 | End: 2024-03-22

## 2024-03-22 RX ADMIN — ONDANSETRON 4 MG: 2 INJECTION INTRAMUSCULAR; INTRAVENOUS at 14:09

## 2024-03-22 RX ADMIN — SODIUM CHLORIDE 1000 ML: 9 INJECTION, SOLUTION INTRAVENOUS at 14:08

## 2024-03-22 RX ADMIN — IOPAMIDOL 100 ML: 755 INJECTION, SOLUTION INTRAVENOUS at 14:46

## 2024-03-22 ASSESSMENT — LIFESTYLE VARIABLES
HOW OFTEN DO YOU HAVE A DRINK CONTAINING ALCOHOL: MONTHLY OR LESS
HOW MANY STANDARD DRINKS CONTAINING ALCOHOL DO YOU HAVE ON A TYPICAL DAY: 1 OR 2

## 2024-03-22 ASSESSMENT — PAIN - FUNCTIONAL ASSESSMENT: PAIN_FUNCTIONAL_ASSESSMENT: NONE - DENIES PAIN

## 2024-03-22 NOTE — ED NOTES
Patient unable to urinate while in the ED. MD aware. Patient states she is feeling much better and is ready to go home. Patient discharged via wheel chair with her daughter

## 2024-03-22 NOTE — ED PROVIDER NOTES
DITROPAN     tamoxifen 20 MG tablet  Commonly known as: NOLVADEX                DISCONTINUED MEDICATIONS:  Current Discharge Medication List          I am the Primary Clinician of Record. Rouqe Mayers MD (electronically signed)    (Please note that parts of this dictation were completed with voice recognition software. Quite often unanticipated grammatical, syntax, homophones, and other interpretive errors are inadvertently transcribed by the computer software. Please disregards these errors. Please excuse any errors that have escaped final proofreading.)    This has been ongoing for the past several weeks and picked back up recently.    Given medical comorbidities abdominal exam history of obstructions will CT image obtain labs and reassess    Labs been generally unremarkable she is feeling somewhat improved with fluids.  There is signs of aspiration on the CT scan unclear how long this has been present but will cover for possible aspiration pneumonia    Records Reviewed (source and summary of external notes): Prior medical records and Nursing notes.    Vitals:    Vitals:    03/22/24 1233   BP: (!) 156/81   Pulse: 68   Resp: 18   Temp: 97.7 °F (36.5 °C)   TempSrc: Oral   SpO2: 97%   Weight: 47.6 kg (105 lb)   Height: 1.727 m (5' 8\")        ED COURSE          Patient was given the following medications:  Medications - No data to display    CONSULTS: See ED Course/MDM for further details.  None        PROCEDURES   Unless otherwise noted above, none  Procedures      CRITICAL CARE TIME   Patient does not meet Critical Care Time, 0 minutes    ED IMPRESSION     1. Nausea vomiting and diarrhea    2. Aspiration into airway, initial encounter          DISPOSITION/PLAN   DISPOSITION      Discharge Note: The patient is stable for discharge home. The signs, symptoms, diagnosis, and discharge instructions have been discussed, understanding conveyed, and agreed upon. The patient is to follow up as recommended or return to ER should their symptoms worsen.      PATIENT REFERRED TO:  Bashir Liang Jr., MD  [address] [facility] [address]  [facility] [address] [phone]    Schedule an appointment as soon as possible for a visit in 1 week  for followup    [facility]  [address] [facility] [address] [phone]  Schedule an appointment as soon as possible for a visit   for followup    [facility] SURGERY AND VASCULAR SPECIALIST  [address] [place] [id number] [phone]            DISCHARGE

## 2024-03-22 NOTE — ED NOTES
Patient states she is not nausea, and feels much better. Patient able to tolerate po fluids at this time

## 2024-03-22 NOTE — ED TRIAGE NOTES
Nausea/vomiting/diarrhea for 3 weeks after eating a Whopper. Took a stool softner a few days ago. Daughter at bedside. Concerned patient may have a bowel obstruction, food poisoning, or  something wrong with her stomach

## 2024-03-24 LAB
EKG ATRIAL RATE: 55 BPM
EKG DIAGNOSIS: NORMAL
EKG P AXIS: 55 DEGREES
EKG P-R INTERVAL: 162 MS
EKG Q-T INTERVAL: 446 MS
EKG QRS DURATION: 84 MS
EKG QTC CALCULATION (BAZETT): 426 MS
EKG R AXIS: -20 DEGREES
EKG T AXIS: 70 DEGREES
EKG VENTRICULAR RATE: 55 BPM

## 2024-03-29 ENCOUNTER — TELEPHONE (OUTPATIENT)
Age: 84
End: 2024-03-29

## 2024-03-29 NOTE — TELEPHONE ENCOUNTER
Referral for R11.2ICD-10-CMNausea with vomiting    R19.7ICD-10-CMDiarrhea. Please review and advise.      Referral  Referral # [id number]  Referral Information    Referral # Creation Date Referral Status Status Update    [id number] 03/29/2024 In Progress 03/29/2024: Status History     Status Reason Referral Type Referral Reasons Referral Class   none Consult for Advice and Opinion none Incoming     To Specialty To Provider To Location/Place of Service To Department   Gastroenterology Al Seaman MD none [facility] GASTROENTEROLOGY     To Vendor Referred By By Location/Place of Service By Department   none Roque Mayers MD none none     Priority Start Date Expiration Date Referral Entered By   Routine 03/25/2024 03/25/2025 Toi Garner     Visits Requested Visits Authorized Visits Completed Visits Scheduled   2 2       Coverages    Payor Plan Auth. Required? Covered? Member # Authorized From Expires Auth # Precert. # Comment   MEDICARE MEDICARE PART A AND B -- Covered [id number] 9/1/2007 -- -- -- --   BCBS ANTH MEDICARE SUPP -- Covered [id number] 1/1/2017 -- -- -- --     Referral Information    Referral # Creation Date Referral Status Status Update    [id number] 03/29/2024 In Progress 03/29/2024: Status History     Status Reason Referral Type Referral Reasons Referral Class   none Consult for Advice and Opinion none Incoming     To Specialty To Provider To Location/Place of Service To Department   Gastroenterology Al Seaman MD none [facility] - GASTROENTEROLOGY     To Vendor Referred By By Location/Place of Service By Department   none Roque Mayers MD none none     Priority Start Date Expiration Date Referral Entered By   Routine 03/25/2024 03/25/2025 Toi Garner     Visits Requested Visits Authorized Visits Completed Visits Scheduled   2 2       Procedure Information    Service Details  Procedure Modifiers Provider Requested

## 2024-04-03 DIAGNOSIS — I10 PRIMARY HYPERTENSION: ICD-10-CM

## 2024-04-04 ENCOUNTER — TELEPHONE (OUTPATIENT)
Facility: CLINIC | Age: 84
End: 2024-04-04

## 2024-04-04 DIAGNOSIS — I10 PRIMARY HYPERTENSION: ICD-10-CM

## 2024-04-04 RX ORDER — LISINOPRIL 20 MG/1
20 TABLET ORAL DAILY
Qty: 90 TABLET | OUTPATIENT
Start: 2024-04-04 | End: 2024-05-04

## 2024-04-04 RX ORDER — LISINOPRIL 20 MG/1
20 TABLET ORAL DAILY
Qty: 30 TABLET | Refills: 0 | Status: SHIPPED | OUTPATIENT
Start: 2024-04-04 | End: 2024-05-04

## 2024-04-04 RX ORDER — LISINOPRIL 20 MG/1
20 TABLET ORAL DAILY
Qty: 30 TABLET | Refills: 0 | OUTPATIENT
Start: 2024-04-04 | End: 2024-05-04

## 2024-04-25 DIAGNOSIS — I10 PRIMARY HYPERTENSION: ICD-10-CM

## 2024-04-25 RX ORDER — LISINOPRIL 20 MG/1
20 TABLET ORAL DAILY
Qty: 90 TABLET | Refills: 0 | Status: SHIPPED | OUTPATIENT
Start: 2024-04-25 | End: 2024-07-24

## 2024-04-25 RX ORDER — METOPROLOL SUCCINATE 25 MG/1
25 TABLET, EXTENDED RELEASE ORAL DAILY
Qty: 90 TABLET | Refills: 0 | Status: SHIPPED | OUTPATIENT
Start: 2024-04-25 | End: 2024-07-24

## 2024-04-25 NOTE — TELEPHONE ENCOUNTER
Lisinopril 20 mg refill as well and metoprolol refill as well- daughter walk in to ask for pt. Verify pharmacy [facility] good for now- plans to change in near future.

## 2024-06-02 ENCOUNTER — HOSPITAL ENCOUNTER (EMERGENCY)
Age: 84
Discharge: HOME | End: 2024-06-02
Attending: EMERGENCY MEDICINE
Payer: MEDICARE

## 2024-06-02 ENCOUNTER — APPOINTMENT (OUTPATIENT)
Age: 84
End: 2024-06-02
Payer: MEDICARE

## 2024-06-02 VITALS
OXYGEN SATURATION: 97 % | BODY MASS INDEX: 15.83 KG/M2 | RESPIRATION RATE: 18 BRPM | HEIGHT: 65 IN | SYSTOLIC BLOOD PRESSURE: 135 MMHG | WEIGHT: 95 LBS | HEART RATE: 78 BPM | DIASTOLIC BLOOD PRESSURE: 62 MMHG | TEMPERATURE: 98.1 F

## 2024-06-02 DIAGNOSIS — R10.9 ABDOMINAL PAIN, UNSPECIFIED ABDOMINAL LOCATION: Primary | ICD-10-CM

## 2024-06-02 LAB
ALBUMIN SERPL-MCNC: 3.6 G/DL (ref 3.4–5)
ALBUMIN/GLOB SERPL: 0.9 (ref 0.8–1.7)
ALP SERPL-CCNC: 129 U/L (ref 45–117)
ALT SERPL-CCNC: 11 U/L (ref 13–56)
ANION GAP SERPL CALC-SCNC: 11 MMOL/L (ref 3–18)
AST SERPL W P-5'-P-CCNC: 14 U/L (ref 10–38)
BASOPHILS # BLD: 0 K/UL (ref 0–0.1)
BASOPHILS NFR BLD: 0 % (ref 0–2)
BILIRUB DIRECT SERPL-MCNC: 0.4 MG/DL (ref 0–0.2)
BILIRUB SERPL-MCNC: 1 MG/DL (ref 0.2–1)
BUN SERPL-MCNC: 18 MG/DL (ref 7–18)
BUN/CREAT SERPL: 17 (ref 12–20)
CA-I BLD-MCNC: 9.7 MG/DL (ref 8.5–10.1)
CHLORIDE SERPL-SCNC: 100 MMOL/L (ref 100–111)
CO2 SERPL-SCNC: 25 MMOL/L (ref 21–32)
CREAT SERPL-MCNC: 1.08 MG/DL (ref 0.6–1.3)
DIFFERENTIAL METHOD BLD: ABNORMAL
EOSINOPHIL # BLD: 0 K/UL (ref 0–0.4)
EOSINOPHIL NFR BLD: 0 % (ref 0–5)
ERYTHROCYTE [DISTWIDTH] IN BLOOD BY AUTOMATED COUNT: 15.1 % (ref 11.6–14.5)
GLOBULIN SER CALC-MCNC: 4.2 G/DL (ref 2–4)
GLUCOSE SERPL-MCNC: 107 MG/DL (ref 74–99)
HCT VFR BLD AUTO: 44.1 % (ref 35–45)
HGB BLD-MCNC: 15 G/DL (ref 12–16)
IMM GRANULOCYTES # BLD AUTO: 0 K/UL (ref 0–0.04)
IMM GRANULOCYTES NFR BLD AUTO: 0 % (ref 0–0.5)
LACTATE SERPL-SCNC: 1.5 MMOL/L (ref 0.4–2)
LIPASE SERPL-CCNC: 21 U/L (ref 13–75)
LYMPHOCYTES # BLD: 0.9 K/UL (ref 0.9–3.6)
LYMPHOCYTES NFR BLD: 10 % (ref 21–52)
MCH RBC QN AUTO: 32.1 PG (ref 24–34)
MCHC RBC AUTO-ENTMCNC: 34 G/DL (ref 31–37)
MCV RBC AUTO: 94.4 FL (ref 78–100)
MONOCYTES # BLD: 0.6 K/UL (ref 0.05–1.2)
MONOCYTES NFR BLD: 6 % (ref 3–10)
NEUTS SEG # BLD: 7.5 K/UL (ref 1.8–8)
NEUTS SEG NFR BLD: 84 % (ref 40–73)
NRBC # BLD: 0 K/UL (ref 0–0.01)
NRBC BLD-RTO: 0 PER 100 WBC
PLATELET # BLD AUTO: 256 K/UL (ref 135–420)
PMV BLD AUTO: 11.5 FL (ref 9.2–11.8)
POTASSIUM SERPL-SCNC: 4 MMOL/L (ref 3.5–5.5)
PROT SERPL-MCNC: 7.8 G/DL (ref 6.4–8.2)
RBC # BLD AUTO: 4.67 M/UL (ref 4.2–5.3)
SODIUM SERPL-SCNC: 136 MMOL/L (ref 136–145)
TROPONIN I SERPL HS-MCNC: 18 NG/L (ref 0–54)
WBC # BLD AUTO: 9.1 K/UL (ref 4.6–13.2)

## 2024-06-02 PROCEDURE — 96361 HYDRATE IV INFUSION ADD-ON: CPT

## 2024-06-02 PROCEDURE — 96374 THER/PROPH/DIAG INJ IV PUSH: CPT

## 2024-06-02 PROCEDURE — 2580000003 HC RX 258: Performed by: EMERGENCY MEDICINE

## 2024-06-02 PROCEDURE — 93005 ELECTROCARDIOGRAM TRACING: CPT | Performed by: EMERGENCY MEDICINE

## 2024-06-02 PROCEDURE — 6360000004 HC RX CONTRAST MEDICATION: Performed by: EMERGENCY MEDICINE

## 2024-06-02 PROCEDURE — 83690 ASSAY OF LIPASE: CPT

## 2024-06-02 PROCEDURE — A4216 STERILE WATER/SALINE, 10 ML: HCPCS | Performed by: EMERGENCY MEDICINE

## 2024-06-02 PROCEDURE — 80048 BASIC METABOLIC PNL TOTAL CA: CPT

## 2024-06-02 PROCEDURE — 83605 ASSAY OF LACTIC ACID: CPT

## 2024-06-02 PROCEDURE — 85025 COMPLETE CBC W/AUTO DIFF WBC: CPT

## 2024-06-02 PROCEDURE — 74177 CT ABD & PELVIS W/CONTRAST: CPT

## 2024-06-02 PROCEDURE — 99285 EMERGENCY DEPT VISIT HI MDM: CPT

## 2024-06-02 PROCEDURE — 84484 ASSAY OF TROPONIN QUANT: CPT

## 2024-06-02 PROCEDURE — 96375 TX/PRO/DX INJ NEW DRUG ADDON: CPT

## 2024-06-02 PROCEDURE — 6360000002 HC RX W HCPCS: Performed by: EMERGENCY MEDICINE

## 2024-06-02 PROCEDURE — 2500000003 HC RX 250 WO HCPCS: Performed by: EMERGENCY MEDICINE

## 2024-06-02 PROCEDURE — 80076 HEPATIC FUNCTION PANEL: CPT

## 2024-06-02 RX ORDER — KETOROLAC TROMETHAMINE 30 MG/ML
15 INJECTION, SOLUTION INTRAMUSCULAR; INTRAVENOUS
Status: COMPLETED | OUTPATIENT
Start: 2024-06-02 | End: 2024-06-02

## 2024-06-02 RX ORDER — 0.9 % SODIUM CHLORIDE 0.9 %
1000 INTRAVENOUS SOLUTION INTRAVENOUS ONCE
Status: DISCONTINUED | OUTPATIENT
Start: 2024-06-02 | End: 2024-06-02

## 2024-06-02 RX ORDER — 0.9 % SODIUM CHLORIDE 0.9 %
1000 INTRAVENOUS SOLUTION INTRAVENOUS ONCE
Status: COMPLETED | OUTPATIENT
Start: 2024-06-02 | End: 2024-06-02

## 2024-06-02 RX ORDER — ONDANSETRON 2 MG/ML
4 INJECTION INTRAMUSCULAR; INTRAVENOUS
Status: COMPLETED | OUTPATIENT
Start: 2024-06-02 | End: 2024-06-02

## 2024-06-02 RX ADMIN — SODIUM CHLORIDE 1000 ML: 9 INJECTION, SOLUTION INTRAVENOUS at 20:25

## 2024-06-02 RX ADMIN — ONDANSETRON 4 MG: 2 INJECTION INTRAMUSCULAR; INTRAVENOUS at 20:18

## 2024-06-02 RX ADMIN — KETOROLAC TROMETHAMINE 15 MG: 30 INJECTION, SOLUTION INTRAMUSCULAR at 20:20

## 2024-06-02 RX ADMIN — IOPAMIDOL 100 ML: 755 INJECTION, SOLUTION INTRAVENOUS at 22:18

## 2024-06-02 RX ADMIN — FAMOTIDINE 20 MG: 10 INJECTION, SOLUTION INTRAVENOUS at 20:24

## 2024-06-02 ASSESSMENT — PAIN DESCRIPTION - LOCATION
LOCATION: ABDOMEN
LOCATION: ABDOMEN

## 2024-06-02 ASSESSMENT — PAIN DESCRIPTION - FREQUENCY: FREQUENCY: INTERMITTENT

## 2024-06-02 ASSESSMENT — PAIN DESCRIPTION - DESCRIPTORS
DESCRIPTORS: CRAMPING
DESCRIPTORS: CRAMPING

## 2024-06-02 ASSESSMENT — PAIN SCALES - GENERAL
PAINLEVEL_OUTOF10: 4
PAINLEVEL_OUTOF10: 4

## 2024-06-02 ASSESSMENT — LIFESTYLE VARIABLES
HOW MANY STANDARD DRINKS CONTAINING ALCOHOL DO YOU HAVE ON A TYPICAL DAY: PATIENT DOES NOT DRINK
HOW OFTEN DO YOU HAVE A DRINK CONTAINING ALCOHOL: NEVER

## 2024-06-02 ASSESSMENT — PAIN - FUNCTIONAL ASSESSMENT
PAIN_FUNCTIONAL_ASSESSMENT: NONE - DENIES PAIN
PAIN_FUNCTIONAL_ASSESSMENT: 0-10

## 2024-06-02 NOTE — ED PROVIDER NOTES
[facility] EMERGENCY DEPT  EMERGENCY DEPARTMENT ENCOUNTER      Pt Name: Julieth Mendoza  MRN: [id number]  Birthdate 1940  Date of evaluation: 6/2/2024  Provider: Robin Cesar MD    CHIEF COMPLAINT       Chief Complaint   Patient presents with    Abdominal Pain       HPI:  Julieth Mendoza is a 83 y.o. female who presents to the emergency department pt c/o abd pain, off and on x 2 months, worse this am. Better now, 4/10-.  + n/v no stool changes. No cp or sob.  H/o surgery for blocked colon due to twisting per pt in 2016 seeing gi, but work up not complete     HPI    Nursing Notes were reviewed.    REVIEW OF SYSTEMS    (2-9 systems for level 4, 10 or more for level 5)     Review of Systems    Except as noted above the remainder of the review of systems was reviewed and negative.       PAST MEDICAL HISTORY     Past Medical History:   Diagnosis Date    Cancer (HCC)     breast    CHF (congestive heart failure) (HCC)     COPD (chronic obstructive pulmonary disease) (HCC)     Hypertension     Osteoporosis     Thyroid disease          SURGICAL HISTORY       Past Surgical History:   Procedure Laterality Date    BREAST LUMPECTOMY Left 06/2022    HAND SURGERY Bilateral     SHOULDER SURGERY Right     TOTAL HIP ARTHROPLASTY Bilateral          CURRENT MEDICATIONS       Previous Medications    CLONIDINE (CATAPRES) 0.1 MG TABLET    Take 1 tablet by mouth every evening    LEVOTHYROXINE (SYNTHROID) 25 MCG TABLET    Take 1 tablet by mouth daily    LISINOPRIL (PRINIVIL;ZESTRIL) 20 MG TABLET    Take 1 tablet by mouth daily    METOPROLOL SUCCINATE (TOPROL XL) 25 MG EXTENDED RELEASE TABLET    Take 1 tablet by mouth daily    ONDANSETRON (ZOFRAN-ODT) 4 MG DISINTEGRATING TABLET    Take 1 tablet by mouth 3 times daily as needed for Nausea or Vomiting    OXYBUTYNIN (DITROPAN) 5 MG TABLET    Take 0.5 tablets by mouth daily    TAMOXIFEN (NOLVADEX) 20 MG TABLET           ALLERGIES     Pcn [penicillins]    FAMILY HISTORY     History reviewed. No  Simple: Patient demonstrates quick and easy understanding

## 2024-06-02 NOTE — ED TRIAGE NOTES
Reports continued abdominal cramping that returned yesterday, along with vomiting. Has been seen in ER and followed by PCP/GI multiple tests done. Has not received definitive diagnosis. Continues to lose weight. Is taking protein shakes, LBM this morning normal.

## 2024-06-03 LAB
EKG ATRIAL RATE: 87 BPM
EKG DIAGNOSIS: NORMAL
EKG P AXIS: 70 DEGREES
EKG P-R INTERVAL: 162 MS
EKG Q-T INTERVAL: 396 MS
EKG QRS DURATION: 76 MS
EKG QTC CALCULATION (BAZETT): 476 MS
EKG R AXIS: -41 DEGREES
EKG T AXIS: 65 DEGREES
EKG VENTRICULAR RATE: 87 BPM

## 2024-06-03 NOTE — ED NOTES
I have reviewed discharge instructions with the patient and daughter.  The patient and daughter verbalized understanding.       Ambulatory to wheelchair with standby assist.    Reviewed medication compliance, follow up with PCP,  return to ER for any new or worrisome concerns

## 2024-06-03 NOTE — ED NOTES
Ambulatory to restroom with standby assist, attempted to collect urine specimen without success. Patient did have BM and incontinent of urine

## 2024-06-03 NOTE — ED NOTES
Patient wants to be discharged, reinforced importance of follow up, return to ER for any new or worrisome concerns.

## 2024-07-29 DIAGNOSIS — I10 PRIMARY HYPERTENSION: ICD-10-CM

## 2024-07-30 DIAGNOSIS — I10 PRIMARY HYPERTENSION: ICD-10-CM

## 2024-07-30 RX ORDER — LISINOPRIL 20 MG/1
20 TABLET ORAL DAILY
Qty: 90 TABLET | OUTPATIENT
Start: 2024-07-30 | End: 2024-08-29

## 2024-07-30 RX ORDER — LISINOPRIL 20 MG/1
20 TABLET ORAL DAILY
Qty: 30 TABLET | Refills: 0 | Status: SHIPPED | OUTPATIENT
Start: 2024-07-30 | End: 2024-08-06 | Stop reason: SDUPTHER

## 2024-07-30 RX ORDER — LISINOPRIL 20 MG/1
20 TABLET ORAL DAILY
Qty: 90 TABLET | Refills: 0 | OUTPATIENT
Start: 2024-07-30 | End: 2024-10-28

## 2024-08-02 RX ORDER — METOPROLOL SUCCINATE 25 MG/1
25 TABLET, EXTENDED RELEASE ORAL DAILY
Qty: 90 TABLET | Refills: 0 | Status: SHIPPED | OUTPATIENT
Start: 2024-08-02 | End: 2024-10-31

## 2024-08-06 ENCOUNTER — OFFICE VISIT (OUTPATIENT)
Facility: CLINIC | Age: 84
End: 2024-08-06
Payer: MEDICARE

## 2024-08-06 VITALS
SYSTOLIC BLOOD PRESSURE: 134 MMHG | WEIGHT: 98 LBS | RESPIRATION RATE: 18 BRPM | OXYGEN SATURATION: 98 % | HEART RATE: 74 BPM | TEMPERATURE: 98.2 F | BODY MASS INDEX: 16.33 KG/M2 | HEIGHT: 65 IN | DIASTOLIC BLOOD PRESSURE: 84 MMHG

## 2024-08-06 DIAGNOSIS — Z13.220 SCREENING FOR CHOLESTEROL LEVEL: ICD-10-CM

## 2024-08-06 DIAGNOSIS — I10 PRIMARY HYPERTENSION: Primary | ICD-10-CM

## 2024-08-06 DIAGNOSIS — Z86.79 HX OF CHRONIC CONGESTIVE HEART FAILURE: ICD-10-CM

## 2024-08-06 DIAGNOSIS — I73.9 PERIPHERAL VASCULAR DISEASE (HCC): ICD-10-CM

## 2024-08-06 DIAGNOSIS — E03.9 HYPOTHYROIDISM, UNSPECIFIED TYPE: ICD-10-CM

## 2024-08-06 DIAGNOSIS — Z13.1 SCREENING FOR DIABETES MELLITUS: ICD-10-CM

## 2024-08-06 PROBLEM — C50.919 PRIMARY MALIGNANT NEOPLASM OF FEMALE BREAST (HCC): Status: RESOLVED | Noted: 2023-08-17 | Resolved: 2024-08-06

## 2024-08-06 PROCEDURE — 3079F DIAST BP 80-89 MM HG: CPT | Performed by: NURSE PRACTITIONER

## 2024-08-06 PROCEDURE — 1090F PRES/ABSN URINE INCON ASSESS: CPT | Performed by: NURSE PRACTITIONER

## 2024-08-06 PROCEDURE — G8399 PT W/DXA RESULTS DOCUMENT: HCPCS | Performed by: NURSE PRACTITIONER

## 2024-08-06 PROCEDURE — 1123F ACP DISCUSS/DSCN MKR DOCD: CPT | Performed by: NURSE PRACTITIONER

## 2024-08-06 PROCEDURE — 99214 OFFICE O/P EST MOD 30 MIN: CPT | Performed by: NURSE PRACTITIONER

## 2024-08-06 PROCEDURE — 3075F SYST BP GE 130 - 139MM HG: CPT | Performed by: NURSE PRACTITIONER

## 2024-08-06 PROCEDURE — G8427 DOCREV CUR MEDS BY ELIG CLIN: HCPCS | Performed by: NURSE PRACTITIONER

## 2024-08-06 PROCEDURE — 4004F PT TOBACCO SCREEN RCVD TLK: CPT | Performed by: NURSE PRACTITIONER

## 2024-08-06 PROCEDURE — G8419 CALC BMI OUT NRM PARAM NOF/U: HCPCS | Performed by: NURSE PRACTITIONER

## 2024-08-06 RX ORDER — CLONIDINE HYDROCHLORIDE 0.1 MG/1
0.1 TABLET ORAL EVERY EVENING
Qty: 90 TABLET | Refills: 0 | Status: SHIPPED | OUTPATIENT
Start: 2024-08-06 | End: 2024-11-04

## 2024-08-06 RX ORDER — METOPROLOL SUCCINATE 25 MG/1
25 TABLET, EXTENDED RELEASE ORAL DAILY
Qty: 90 TABLET | Refills: 0 | Status: SHIPPED | OUTPATIENT
Start: 2024-08-06 | End: 2024-11-04

## 2024-08-06 RX ORDER — LEVOTHYROXINE SODIUM 0.03 MG/1
25 TABLET ORAL DAILY
Qty: 90 TABLET | Refills: 0 | Status: SHIPPED | OUTPATIENT
Start: 2024-08-06 | End: 2024-11-04

## 2024-08-06 RX ORDER — LISINOPRIL 20 MG/1
20 TABLET ORAL DAILY
Qty: 30 TABLET | Refills: 0 | Status: SHIPPED | OUTPATIENT
Start: 2024-08-06 | End: 2024-09-05

## 2024-08-06 ASSESSMENT — PATIENT HEALTH QUESTIONNAIRE - PHQ9
SUM OF ALL RESPONSES TO PHQ QUESTIONS 1-9: 0
SUM OF ALL RESPONSES TO PHQ QUESTIONS 1-9: 0
SUM OF ALL RESPONSES TO PHQ9 QUESTIONS 1 & 2: 0
SUM OF ALL RESPONSES TO PHQ QUESTIONS 1-9: 0
SUM OF ALL RESPONSES TO PHQ QUESTIONS 1-9: 0
2. FEELING DOWN, DEPRESSED OR HOPELESS: NOT AT ALL
1. LITTLE INTEREST OR PLEASURE IN DOING THINGS: NOT AT ALL

## 2024-08-06 NOTE — PROGRESS NOTES
Julieth Mendoza (: 1940) is a 83 y.o. female patient, here for evaluation of the following chief complaint(s):  Medication Refill       ASSESSMENT/PLAN:  1. Primary hypertension  -     CBC with Auto Differential; Future  -     Comprehensive Metabolic Panel; Future  -     cloNIDine (CATAPRES) 0.1 MG tablet; Take 1 tablet by mouth every evening, Disp-90 tablet, R-0Normal  -     lisinopril (PRINIVIL;ZESTRIL) 20 MG tablet; Take 1 tablet by mouth daily, Disp-30 tablet, R-0**Patient requests 90 days supply**Normal  -     metoprolol succinate (TOPROL XL) 25 MG extended release tablet; Take 1 tablet by mouth daily, Disp-90 tablet, R-0Normal  2. Hypothyroidism, unspecified type  -     TSH; Future  -     Vitamin B12; Future  -     levothyroxine (SYNTHROID) 25 MCG tablet; Take 1 tablet by mouth daily, Disp-90 tablet, R-0Normal  3. Hx of chronic congestive heart failure  4. Screening for cholesterol level  -     Lipid Panel; Future  5. Screening for diabetes mellitus  -     Hemoglobin A1C; Future  6. Peripheral vascular disease (HCC)    Blood pressure stable and at goal today.  Plan patient will return for nurse visit with blood pressure monitoring cuff from home for comparison.  Will move forward from there with blood pressure log for about 1 week or so.  Make decisions for blood pressure medication management.  Patient is seeing vascular and having further studies completed keep these appointments as directed.    Blood work ordered  Patient is seeing vascular.  And will continue with appointments in September            No follow-ups on file.      SUBJECTIVE/OBJECTIVE:  83-year-old female presents to the office for: Chronic condition management.  Last seen in office for primary care management 2023 over 1-year.  Blood work was ordered in July and not completed.  Patient was hospitalized in  has been seeing vascular surgeon Dr. Bolaños has a follow-up appointment  to discuss ultrasound testing.  Patient

## 2024-08-06 NOTE — PROGRESS NOTES
Chief Complaint   Patient presents with    Medication Refill       \"Have you been to the ER, urgent care clinic since your last visit?  Hospitalized since your last visit?\"    Multiple visits to ER and doctors since February for Nausea and vomiting, diarrhea with cramping and bloating  “Have you seen or consulted any other health care providers outside of [facility] since your last visit?”    Dr. Bolaños, Vascular

## 2024-08-14 ENCOUNTER — NURSE ONLY (OUTPATIENT)
Facility: CLINIC | Age: 84
End: 2024-08-14

## 2024-08-14 ENCOUNTER — HOSPITAL ENCOUNTER (OUTPATIENT)
Age: 84
Discharge: HOME | End: 2024-08-17
Payer: MEDICARE

## 2024-08-14 DIAGNOSIS — I10 PRIMARY HYPERTENSION: ICD-10-CM

## 2024-08-14 DIAGNOSIS — I10 PRIMARY HYPERTENSION: Primary | ICD-10-CM

## 2024-08-14 DIAGNOSIS — E03.9 HYPOTHYROIDISM, UNSPECIFIED TYPE: ICD-10-CM

## 2024-08-14 DIAGNOSIS — Z13.220 SCREENING FOR CHOLESTEROL LEVEL: ICD-10-CM

## 2024-08-14 DIAGNOSIS — Z13.1 SCREENING FOR DIABETES MELLITUS: ICD-10-CM

## 2024-08-14 LAB
ALBUMIN SERPL-MCNC: 3.6 G/DL (ref 3.4–5)
ALBUMIN/GLOB SERPL: 0.9 (ref 0.8–1.7)
ALP SERPL-CCNC: 132 U/L (ref 45–117)
ALT SERPL-CCNC: 16 U/L (ref 13–56)
ANION GAP SERPL CALC-SCNC: 11 MMOL/L (ref 3–18)
AST SERPL W P-5'-P-CCNC: 13 U/L (ref 10–38)
BASOPHILS # BLD: 0 K/UL (ref 0–0.1)
BASOPHILS NFR BLD: 1 % (ref 0–2)
BILIRUB SERPL-MCNC: 0.6 MG/DL (ref 0.2–1)
BUN SERPL-MCNC: 17 MG/DL (ref 7–18)
BUN/CREAT SERPL: 16 (ref 12–20)
CA-I BLD-MCNC: 9.5 MG/DL (ref 8.5–10.1)
CHLORIDE SERPL-SCNC: 101 MMOL/L (ref 100–111)
CHOLEST SERPL-MCNC: 141 MG/DL
CO2 SERPL-SCNC: 23 MMOL/L (ref 21–32)
CREAT SERPL-MCNC: 1.05 MG/DL (ref 0.6–1.3)
DIFFERENTIAL METHOD BLD: ABNORMAL
EOSINOPHIL # BLD: 0.1 K/UL (ref 0–0.4)
EOSINOPHIL NFR BLD: 2 % (ref 0–5)
ERYTHROCYTE [DISTWIDTH] IN BLOOD BY AUTOMATED COUNT: 14.3 % (ref 11.6–14.5)
EST. AVERAGE GLUCOSE BLD GHB EST-MCNC: 100 MG/DL
GLOBULIN SER CALC-MCNC: 3.9 G/DL (ref 2–4)
GLUCOSE SERPL-MCNC: 97 MG/DL (ref 74–99)
HBA1C MFR BLD: 5.1 % (ref 4.2–5.6)
HCT VFR BLD AUTO: 38.6 % (ref 35–45)
HDLC SERPL-MCNC: 76 MG/DL (ref 40–60)
HDLC SERPL: 1.9 (ref 0–5)
HGB BLD-MCNC: 13.2 G/DL (ref 12–16)
IMM GRANULOCYTES # BLD AUTO: 0 K/UL (ref 0–0.04)
IMM GRANULOCYTES NFR BLD AUTO: 0 % (ref 0–0.5)
LDLC SERPL CALC-MCNC: 47.6 MG/DL (ref 0–100)
LIPID PANEL: ABNORMAL
LYMPHOCYTES # BLD: 1.6 K/UL (ref 0.9–3.6)
LYMPHOCYTES NFR BLD: 26 % (ref 21–52)
MCH RBC QN AUTO: 31.7 PG (ref 24–34)
MCHC RBC AUTO-ENTMCNC: 34.2 G/DL (ref 31–37)
MCV RBC AUTO: 92.8 FL (ref 78–100)
MONOCYTES # BLD: 0.5 K/UL (ref 0.05–1.2)
MONOCYTES NFR BLD: 9 % (ref 3–10)
NEUTS SEG # BLD: 3.9 K/UL (ref 1.8–8)
NEUTS SEG NFR BLD: 62 % (ref 40–73)
NRBC # BLD: 0 K/UL (ref 0–0.01)
NRBC BLD-RTO: 0 PER 100 WBC
PLATELET # BLD AUTO: 232 K/UL (ref 135–420)
PMV BLD AUTO: 11.9 FL (ref 9.2–11.8)
POTASSIUM SERPL-SCNC: 4 MMOL/L (ref 3.5–5.5)
PROT SERPL-MCNC: 7.5 G/DL (ref 6.4–8.2)
RBC # BLD AUTO: 4.16 M/UL (ref 4.2–5.3)
SODIUM SERPL-SCNC: 135 MMOL/L (ref 136–145)
TRIGL SERPL-MCNC: 87 MG/DL
TSH SERPL DL<=0.05 MIU/L-ACNC: 1.18 UIU/ML (ref 0.36–3.74)
VIT B12 SERPL-MCNC: 299 PG/ML (ref 211–911)
VLDLC SERPL CALC-MCNC: 17.4 MG/DL
WBC # BLD AUTO: 6.1 K/UL (ref 4.6–13.2)

## 2024-08-14 PROCEDURE — 36415 COLL VENOUS BLD VENIPUNCTURE: CPT

## 2024-08-14 PROCEDURE — 82607 VITAMIN B-12: CPT

## 2024-08-14 PROCEDURE — 85025 COMPLETE CBC W/AUTO DIFF WBC: CPT

## 2024-08-14 PROCEDURE — 84443 ASSAY THYROID STIM HORMONE: CPT

## 2024-08-14 PROCEDURE — 80061 LIPID PANEL: CPT

## 2024-08-14 PROCEDURE — 80053 COMPREHEN METABOLIC PANEL: CPT

## 2024-08-14 PROCEDURE — 83036 HEMOGLOBIN GLYCOSYLATED A1C: CPT

## 2024-09-09 ENCOUNTER — OFFICE VISIT (OUTPATIENT)
Facility: CLINIC | Age: 84
End: 2024-09-09
Payer: MEDICARE

## 2024-09-09 VITALS
OXYGEN SATURATION: 96 % | WEIGHT: 98 LBS | BODY MASS INDEX: 16.33 KG/M2 | DIASTOLIC BLOOD PRESSURE: 92 MMHG | HEIGHT: 65 IN | RESPIRATION RATE: 18 BRPM | TEMPERATURE: 97.3 F | HEART RATE: 74 BPM | SYSTOLIC BLOOD PRESSURE: 174 MMHG

## 2024-09-09 DIAGNOSIS — I10 PRIMARY HYPERTENSION: Primary | ICD-10-CM

## 2024-09-09 DIAGNOSIS — N18.31 CHRONIC KIDNEY DISEASE, STAGE 3A (HCC): ICD-10-CM

## 2024-09-09 DIAGNOSIS — R07.9 CHEST PAIN, UNSPECIFIED TYPE: ICD-10-CM

## 2024-09-09 DIAGNOSIS — Z86.79 HX OF CHRONIC CONGESTIVE HEART FAILURE: ICD-10-CM

## 2024-09-09 DIAGNOSIS — M81.0 AGE-RELATED OSTEOPOROSIS WITHOUT CURRENT PATHOLOGICAL FRACTURE: ICD-10-CM

## 2024-09-09 DIAGNOSIS — E03.9 HYPOTHYROIDISM, UNSPECIFIED TYPE: ICD-10-CM

## 2024-09-09 DIAGNOSIS — I73.9 PERIPHERAL VASCULAR DISEASE (HCC): ICD-10-CM

## 2024-09-09 PROCEDURE — 1123F ACP DISCUSS/DSCN MKR DOCD: CPT | Performed by: NURSE PRACTITIONER

## 2024-09-09 PROCEDURE — 4004F PT TOBACCO SCREEN RCVD TLK: CPT | Performed by: NURSE PRACTITIONER

## 2024-09-09 PROCEDURE — 1090F PRES/ABSN URINE INCON ASSESS: CPT | Performed by: NURSE PRACTITIONER

## 2024-09-09 PROCEDURE — G8399 PT W/DXA RESULTS DOCUMENT: HCPCS | Performed by: NURSE PRACTITIONER

## 2024-09-09 PROCEDURE — 3078F DIAST BP <80 MM HG: CPT | Performed by: NURSE PRACTITIONER

## 2024-09-09 PROCEDURE — G8427 DOCREV CUR MEDS BY ELIG CLIN: HCPCS | Performed by: NURSE PRACTITIONER

## 2024-09-09 PROCEDURE — 99214 OFFICE O/P EST MOD 30 MIN: CPT | Performed by: NURSE PRACTITIONER

## 2024-09-09 PROCEDURE — G8419 CALC BMI OUT NRM PARAM NOF/U: HCPCS | Performed by: NURSE PRACTITIONER

## 2024-09-09 PROCEDURE — 3077F SYST BP >= 140 MM HG: CPT | Performed by: NURSE PRACTITIONER

## 2024-09-09 RX ORDER — NITROGLYCERIN 0.4 MG/1
0.4 TABLET SUBLINGUAL EVERY 5 MIN PRN
COMMUNITY
End: 2024-09-09 | Stop reason: SDUPTHER

## 2024-09-09 RX ORDER — METOPROLOL SUCCINATE 25 MG/1
25 TABLET, EXTENDED RELEASE ORAL DAILY
Qty: 90 TABLET | Refills: 1 | Status: SHIPPED | OUTPATIENT
Start: 2024-09-09 | End: 2025-03-08

## 2024-09-09 RX ORDER — NITROGLYCERIN 0.4 MG/1
TABLET SUBLINGUAL
Qty: 25 TABLET | Refills: 0 | Status: SHIPPED | OUTPATIENT
Start: 2024-09-09

## 2024-09-09 RX ORDER — LISINOPRIL 30 MG/1
30 TABLET ORAL DAILY
Qty: 90 TABLET | Refills: 1 | Status: SHIPPED | OUTPATIENT
Start: 2024-09-09 | End: 2025-03-08

## 2024-09-09 RX ORDER — CLONIDINE HYDROCHLORIDE 0.1 MG/1
0.1 TABLET ORAL EVERY EVENING
Qty: 90 TABLET | Refills: 1 | Status: SHIPPED | OUTPATIENT
Start: 2024-09-09 | End: 2025-03-08

## 2024-09-09 RX ORDER — LEVOTHYROXINE SODIUM 25 UG/1
25 TABLET ORAL DAILY
Qty: 90 TABLET | Refills: 1 | Status: SHIPPED | OUTPATIENT
Start: 2024-09-09 | End: 2025-03-08

## 2024-09-09 SDOH — ECONOMIC STABILITY: INCOME INSECURITY: HOW HARD IS IT FOR YOU TO PAY FOR THE VERY BASICS LIKE FOOD, HOUSING, MEDICAL CARE, AND HEATING?: NOT VERY HARD

## 2024-09-09 SDOH — ECONOMIC STABILITY: FOOD INSECURITY: WITHIN THE PAST 12 MONTHS, YOU WORRIED THAT YOUR FOOD WOULD RUN OUT BEFORE YOU GOT MONEY TO BUY MORE.: NEVER TRUE

## 2024-09-09 SDOH — ECONOMIC STABILITY: FOOD INSECURITY: WITHIN THE PAST 12 MONTHS, THE FOOD YOU BOUGHT JUST DIDN'T LAST AND YOU DIDN'T HAVE MONEY TO GET MORE.: NEVER TRUE

## 2024-09-19 ENCOUNTER — APPOINTMENT (OUTPATIENT)
Age: 84
End: 2024-09-19
Payer: MEDICARE

## 2024-09-19 ENCOUNTER — HOSPITAL ENCOUNTER (EMERGENCY)
Age: 84
Discharge: HOME | End: 2024-09-19
Attending: FAMILY MEDICINE
Payer: MEDICARE

## 2024-09-19 VITALS
SYSTOLIC BLOOD PRESSURE: 165 MMHG | OXYGEN SATURATION: 96 % | HEIGHT: 65 IN | TEMPERATURE: 98.4 F | HEART RATE: 80 BPM | WEIGHT: 98 LBS | BODY MASS INDEX: 16.33 KG/M2 | RESPIRATION RATE: 13 BRPM | DIASTOLIC BLOOD PRESSURE: 85 MMHG

## 2024-09-19 DIAGNOSIS — I16.0 HYPERTENSIVE URGENCY: Primary | ICD-10-CM

## 2024-09-19 LAB
ALBUMIN SERPL-MCNC: 3.8 G/DL (ref 3.4–5)
ALBUMIN/GLOB SERPL: 0.9 (ref 0.8–1.7)
ALP SERPL-CCNC: 157 U/L (ref 45–117)
ALT SERPL-CCNC: 12 U/L (ref 13–56)
ANION GAP SERPL CALC-SCNC: 10 MMOL/L (ref 3–18)
APPEARANCE UR: CLEAR
AST SERPL W P-5'-P-CCNC: 15 U/L (ref 10–38)
BASOPHILS # BLD: 0 K/UL (ref 0–0.1)
BASOPHILS NFR BLD: 1 % (ref 0–2)
BILIRUB SERPL-MCNC: 0.6 MG/DL (ref 0.2–1)
BILIRUB UR QL: NEGATIVE
BUN SERPL-MCNC: 17 MG/DL (ref 7–18)
BUN/CREAT SERPL: 18 (ref 12–20)
CA-I BLD-MCNC: 9.4 MG/DL (ref 8.5–10.1)
CHLORIDE SERPL-SCNC: 99 MMOL/L (ref 100–111)
CO2 SERPL-SCNC: 26 MMOL/L (ref 21–32)
COLOR UR: YELLOW
CREAT SERPL-MCNC: 0.92 MG/DL (ref 0.6–1.3)
DIFFERENTIAL METHOD BLD: ABNORMAL
EOSINOPHIL # BLD: 0.1 K/UL (ref 0–0.4)
EOSINOPHIL NFR BLD: 2 % (ref 0–5)
ERYTHROCYTE [DISTWIDTH] IN BLOOD BY AUTOMATED COUNT: 15.2 % (ref 11.6–14.5)
FLUAV RNA SPEC QL NAA+PROBE: NOT DETECTED
FLUBV RNA SPEC QL NAA+PROBE: NOT DETECTED
GLOBULIN SER CALC-MCNC: 4.2 G/DL (ref 2–4)
GLUCOSE SERPL-MCNC: 92 MG/DL (ref 74–99)
GLUCOSE UR STRIP.AUTO-MCNC: NEGATIVE MG/DL
HCT VFR BLD AUTO: 42.4 % (ref 35–45)
HGB BLD-MCNC: 14.2 G/DL (ref 12–16)
HGB UR QL STRIP: NEGATIVE
IMM GRANULOCYTES # BLD AUTO: 0 K/UL (ref 0–0.04)
IMM GRANULOCYTES NFR BLD AUTO: 1 % (ref 0–0.5)
KETONES UR QL STRIP.AUTO: NEGATIVE MG/DL
LEUKOCYTE ESTERASE UR QL STRIP.AUTO: NEGATIVE
LYMPHOCYTES # BLD: 1.4 K/UL (ref 0.9–3.6)
LYMPHOCYTES NFR BLD: 25 % (ref 21–52)
MCH RBC QN AUTO: 32 PG (ref 24–34)
MCHC RBC AUTO-ENTMCNC: 33.5 G/DL (ref 31–37)
MCV RBC AUTO: 95.5 FL (ref 78–100)
MONOCYTES # BLD: 0.6 K/UL (ref 0.05–1.2)
MONOCYTES NFR BLD: 11 % (ref 3–10)
NEUTS SEG # BLD: 3.6 K/UL (ref 1.8–8)
NEUTS SEG NFR BLD: 60 % (ref 40–73)
NITRITE UR QL STRIP.AUTO: NEGATIVE
NRBC # BLD: 0 K/UL (ref 0–0.01)
NRBC BLD-RTO: 0 PER 100 WBC
PH UR STRIP: 7 (ref 5–8)
PLATELET # BLD AUTO: 207 K/UL (ref 135–420)
PMV BLD AUTO: 11.7 FL (ref 9.2–11.8)
POTASSIUM SERPL-SCNC: 3.4 MMOL/L (ref 3.5–5.5)
PROT SERPL-MCNC: 8 G/DL (ref 6.4–8.2)
PROT UR STRIP-MCNC: NEGATIVE MG/DL
RBC # BLD AUTO: 4.44 M/UL (ref 4.2–5.3)
SARS-COV-2 RNA RESP QL NAA+PROBE: NOT DETECTED
SODIUM SERPL-SCNC: 135 MMOL/L (ref 136–145)
SP GR UR REFRACTOMETRY: 1.01 (ref 1–1.03)
TROPONIN I SERPL HS-MCNC: 13 NG/L (ref 0–54)
TROPONIN I SERPL HS-MCNC: 13 NG/L (ref 0–54)
UROBILINOGEN UR QL STRIP.AUTO: 0.2 EU/DL (ref 0.2–1)
WBC # BLD AUTO: 5.8 K/UL (ref 4.6–13.2)

## 2024-09-19 PROCEDURE — 85025 COMPLETE CBC W/AUTO DIFF WBC: CPT

## 2024-09-19 PROCEDURE — 81003 URINALYSIS AUTO W/O SCOPE: CPT

## 2024-09-19 PROCEDURE — 99285 EMERGENCY DEPT VISIT HI MDM: CPT

## 2024-09-19 PROCEDURE — 96374 THER/PROPH/DIAG INJ IV PUSH: CPT

## 2024-09-19 PROCEDURE — 36415 COLL VENOUS BLD VENIPUNCTURE: CPT

## 2024-09-19 PROCEDURE — 71045 X-RAY EXAM CHEST 1 VIEW: CPT

## 2024-09-19 PROCEDURE — 84484 ASSAY OF TROPONIN QUANT: CPT

## 2024-09-19 PROCEDURE — 80053 COMPREHEN METABOLIC PANEL: CPT

## 2024-09-19 PROCEDURE — 87636 SARSCOV2 & INF A&B AMP PRB: CPT

## 2024-09-19 PROCEDURE — 93005 ELECTROCARDIOGRAM TRACING: CPT | Performed by: FAMILY MEDICINE

## 2024-09-19 PROCEDURE — 6360000002 HC RX W HCPCS: Performed by: FAMILY MEDICINE

## 2024-09-19 RX ORDER — HYDRALAZINE HYDROCHLORIDE 20 MG/ML
10 INJECTION INTRAMUSCULAR; INTRAVENOUS ONCE
Status: COMPLETED | OUTPATIENT
Start: 2024-09-19 | End: 2024-09-19

## 2024-09-19 RX ADMIN — HYDRALAZINE HYDROCHLORIDE 10 MG: 20 INJECTION INTRAMUSCULAR; INTRAVENOUS at 18:48

## 2024-09-19 ASSESSMENT — LIFESTYLE VARIABLES
HOW MANY STANDARD DRINKS CONTAINING ALCOHOL DO YOU HAVE ON A TYPICAL DAY: 1 OR 2
HOW OFTEN DO YOU HAVE A DRINK CONTAINING ALCOHOL: 4 OR MORE TIMES A WEEK

## 2024-09-19 ASSESSMENT — PAIN - FUNCTIONAL ASSESSMENT: PAIN_FUNCTIONAL_ASSESSMENT: 0-10

## 2024-09-19 ASSESSMENT — PAIN SCALES - GENERAL: PAINLEVEL_OUTOF10: 0

## 2024-09-20 ENCOUNTER — OFFICE VISIT (OUTPATIENT)
Facility: CLINIC | Age: 84
End: 2024-09-20
Payer: MEDICARE

## 2024-09-20 ENCOUNTER — TELEPHONE (OUTPATIENT)
Facility: CLINIC | Age: 84
End: 2024-09-20

## 2024-09-20 VITALS
HEIGHT: 65 IN | DIASTOLIC BLOOD PRESSURE: 78 MMHG | OXYGEN SATURATION: 96 % | HEART RATE: 81 BPM | RESPIRATION RATE: 18 BRPM | WEIGHT: 96 LBS | TEMPERATURE: 97.3 F | SYSTOLIC BLOOD PRESSURE: 132 MMHG | BODY MASS INDEX: 15.99 KG/M2

## 2024-09-20 DIAGNOSIS — I25.119 ATHEROSCLEROSIS OF NATIVE CORONARY ARTERY OF NATIVE HEART WITH ANGINA PECTORIS (HCC): ICD-10-CM

## 2024-09-20 DIAGNOSIS — K59.04 CHRONIC IDIOPATHIC CONSTIPATION: Primary | ICD-10-CM

## 2024-09-20 DIAGNOSIS — J30.1 SEASONAL ALLERGIC RHINITIS DUE TO POLLEN: ICD-10-CM

## 2024-09-20 DIAGNOSIS — I20.9 ANGINA PECTORIS, UNSPECIFIED (HCC): ICD-10-CM

## 2024-09-20 LAB
EKG ATRIAL RATE: 72 BPM
EKG DIAGNOSIS: NORMAL
EKG P AXIS: 68 DEGREES
EKG P-R INTERVAL: 180 MS
EKG Q-T INTERVAL: 410 MS
EKG QRS DURATION: 80 MS
EKG QTC CALCULATION (BAZETT): 448 MS
EKG R AXIS: -25 DEGREES
EKG T AXIS: 61 DEGREES
EKG VENTRICULAR RATE: 72 BPM

## 2024-09-20 PROCEDURE — G8427 DOCREV CUR MEDS BY ELIG CLIN: HCPCS | Performed by: NURSE PRACTITIONER

## 2024-09-20 PROCEDURE — 3075F SYST BP GE 130 - 139MM HG: CPT | Performed by: NURSE PRACTITIONER

## 2024-09-20 PROCEDURE — 4004F PT TOBACCO SCREEN RCVD TLK: CPT | Performed by: NURSE PRACTITIONER

## 2024-09-20 PROCEDURE — G8399 PT W/DXA RESULTS DOCUMENT: HCPCS | Performed by: NURSE PRACTITIONER

## 2024-09-20 PROCEDURE — 3078F DIAST BP <80 MM HG: CPT | Performed by: NURSE PRACTITIONER

## 2024-09-20 PROCEDURE — 1123F ACP DISCUSS/DSCN MKR DOCD: CPT | Performed by: NURSE PRACTITIONER

## 2024-09-20 PROCEDURE — G8419 CALC BMI OUT NRM PARAM NOF/U: HCPCS | Performed by: NURSE PRACTITIONER

## 2024-09-20 PROCEDURE — 99213 OFFICE O/P EST LOW 20 MIN: CPT | Performed by: NURSE PRACTITIONER

## 2024-09-20 PROCEDURE — 1090F PRES/ABSN URINE INCON ASSESS: CPT | Performed by: NURSE PRACTITIONER

## 2024-09-20 RX ORDER — CETIRIZINE HYDROCHLORIDE 10 MG/1
10 TABLET ORAL DAILY
Qty: 90 TABLET | Refills: 0 | Status: SHIPPED | OUTPATIENT
Start: 2024-09-20

## 2024-09-20 RX ORDER — POLYETHYLENE GLYCOL 3350 17 G/17G
17 POWDER, FOR SOLUTION ORAL DAILY
Qty: 1530 G | Refills: 1 | Status: SHIPPED | OUTPATIENT
Start: 2024-09-20 | End: 2025-03-19

## 2024-09-20 ASSESSMENT — ENCOUNTER SYMPTOMS
EYES NEGATIVE: 1
GASTROINTESTINAL NEGATIVE: 1
RESPIRATORY NEGATIVE: 1

## 2024-09-20 NOTE — TELEPHONE ENCOUNTER
I explained to daughter over the phone when I made the appointment - that the office there may need a referral but may not. She would have to check with their office or insurance to see. But because pt was seen in the ER yesterday, I made a appt for today to follow up on medication questions daughter had.

## 2024-09-20 NOTE — TELEPHONE ENCOUNTER
Pt daughter wanted to let nolan know that they found a cardiologist with Kareem Adamson or Blayne Murguia office number [phone]   Address - [address]  - please advise - pt has appt today at 3:30 9/20/24

## 2024-09-23 ENCOUNTER — OFFICE VISIT (OUTPATIENT)
Age: 84
End: 2024-09-23

## 2024-09-23 VITALS
HEART RATE: 78 BPM | SYSTOLIC BLOOD PRESSURE: 160 MMHG | OXYGEN SATURATION: 100 % | DIASTOLIC BLOOD PRESSURE: 100 MMHG | WEIGHT: 97 LBS | BODY MASS INDEX: 16.14 KG/M2

## 2024-09-23 DIAGNOSIS — R06.02 SHORTNESS OF BREATH: Primary | ICD-10-CM

## 2024-09-23 DIAGNOSIS — R06.00 DYSPNEA, UNSPECIFIED TYPE: ICD-10-CM

## 2024-09-23 DIAGNOSIS — I10 ESSENTIAL HYPERTENSION WITH GOAL BLOOD PRESSURE LESS THAN 140/90: ICD-10-CM

## 2024-09-23 RX ORDER — AMLODIPINE BESYLATE 10 MG/1
10 TABLET ORAL DAILY
Qty: 90 TABLET | Refills: 2 | Status: SHIPPED | OUTPATIENT
Start: 2024-09-23

## 2024-09-27 ENCOUNTER — TELEMEDICINE (OUTPATIENT)
Facility: CLINIC | Age: 84
End: 2024-09-27

## 2024-09-27 DIAGNOSIS — Z00.00 MEDICARE ANNUAL WELLNESS VISIT, SUBSEQUENT: ICD-10-CM

## 2024-09-27 DIAGNOSIS — M54.2 CERVICAL PAIN: Primary | ICD-10-CM

## 2024-09-27 ASSESSMENT — LIFESTYLE VARIABLES
HOW MANY STANDARD DRINKS CONTAINING ALCOHOL DO YOU HAVE ON A TYPICAL DAY: 1 OR 2
HOW OFTEN DO YOU HAVE A DRINK CONTAINING ALCOHOL: 2-3 TIMES A WEEK

## 2024-09-27 ASSESSMENT — PATIENT HEALTH QUESTIONNAIRE - PHQ9
SUM OF ALL RESPONSES TO PHQ QUESTIONS 1-9: 0
SUM OF ALL RESPONSES TO PHQ QUESTIONS 1-9: 0
SUM OF ALL RESPONSES TO PHQ9 QUESTIONS 1 & 2: 0
SUM OF ALL RESPONSES TO PHQ QUESTIONS 1-9: 0
SUM OF ALL RESPONSES TO PHQ QUESTIONS 1-9: 0
1. LITTLE INTEREST OR PLEASURE IN DOING THINGS: NOT AT ALL
2. FEELING DOWN, DEPRESSED OR HOPELESS: NOT AT ALL

## 2024-09-30 ENCOUNTER — CLINICAL DOCUMENTATION (OUTPATIENT)
Dept: SPIRITUAL SERVICES | Age: 84
End: 2024-09-30

## 2024-09-30 NOTE — PROGRESS NOTES
Advance Care Planning   Ambulatory ACP Specialist Patient Outreach    Date:  9/30/2024    ACP Specialist:  Radha Vernon    Outreach call to patient in follow-up to ACP Specialist referral from:Therese Holt FNP    [x] PCP  [] Provider   [] Ambulatory Care Management [] Other     For:                  [x] Advance Directive Assistance              [] Complete Portable DNR order              [] Complete POST/POLST/MOST              [] Code Status Discussion             [] Discuss Goals of Care             [] Early ACP Decision-Making              [] Other (Specify)    Date Referral Received:9/27/24    Next Step:   [] ACP scheduled conversation  [x] Outreach again in one week               [] Email / Mail ACP Info Sheets  [] Email / Mail Advance Directive   [] Closing referral.  Routing closure to referring provider/staff and to ACP Specialist .    [] Closure letter mailed to patient with invitation to contact ACP Specialist if / when ready.   [] Other (Specify here):       [x] At this time, Healthcare Decision Maker Is:   Primary Decision Maker: Ce Pritchard [facility] - [phone]         [] Primary agent named in scanned advance directive.    [x] Legal Next of Kin.     [] Unable to determine legal decision maker at this time.    Outreaches:         [x] 1st -  Date:  9/30/24               Intervention:  [] Spoke with Patient   [x] Left Voice mail [] Email / Mail    [] [employer]  [] Other (Specify) :     Outcomes:  Outreach phone call to the patient on home phone number which is also listed as the mobile number.  Unable to reach.  Provided contact information on [employer] requesting a return call.  Will attempt to follow up in one week.           [] 2nd -  Date:                 Intervention:  [] Spoke with Patient  [] Left Voice mail [] Email / Mail    [] [employer]  [] Other (Specify) :              Outcomes:                [] 3rd -  Date:                Intervention:  [] Spoke with Patient   [] Left

## 2024-10-07 ENCOUNTER — CLINICAL DOCUMENTATION (OUTPATIENT)
Dept: SPIRITUAL SERVICES | Age: 84
End: 2024-10-07

## 2024-10-07 ENCOUNTER — TELEPHONE (OUTPATIENT)
Facility: CLINIC | Age: 84
End: 2024-10-07

## 2024-10-07 DIAGNOSIS — M25.541 ARTHRALGIA OF HANDS, BILATERAL: ICD-10-CM

## 2024-10-07 DIAGNOSIS — M54.2 CERVICAL PAIN: Primary | ICD-10-CM

## 2024-10-07 DIAGNOSIS — M25.542 ARTHRALGIA OF HANDS, BILATERAL: ICD-10-CM

## 2024-10-07 RX ORDER — ACETAMINOPHEN 500 MG
1000 TABLET ORAL 3 TIMES DAILY PRN
Qty: 180 TABLET | Refills: 5 | Status: SHIPPED | OUTPATIENT
Start: 2024-10-07

## 2024-10-07 NOTE — TELEPHONE ENCOUNTER
Pt daughter asking if there any pain meds that she can take while taking her other medications. Please call and advise pt daughter. Also if we can switch her pharmacy to hometown pharmacy please.

## 2024-10-07 NOTE — TELEPHONE ENCOUNTER
Patient's daughter asked if you could send prescription strength dose of Tylenol to [facility].

## 2024-10-07 NOTE — TELEPHONE ENCOUNTER
Patient's daughter returned call. Notified that Tylenol order was sent to pharmacy, but may not be covered by insurance because it is sold over the counter.

## 2024-10-08 ENCOUNTER — HOSPITAL ENCOUNTER (OUTPATIENT)
Age: 84
Discharge: HOME | End: 2024-10-11
Payer: MEDICARE

## 2024-10-08 DIAGNOSIS — M54.2 CERVICAL PAIN: ICD-10-CM

## 2024-10-08 PROCEDURE — 72050 X-RAY EXAM NECK SPINE 4/5VWS: CPT

## 2024-10-11 ENCOUNTER — TELEPHONE (OUTPATIENT)
Facility: CLINIC | Age: 84
End: 2024-10-11

## 2024-10-11 NOTE — TELEPHONE ENCOUNTER
Called patient's daughter, Ce, and notified that NATHEN Holt does not recommend patient take Omega supplement due to medication interactions and side effects.

## 2024-10-11 NOTE — TELEPHONE ENCOUNTER
----- Message from TRISHA Dale sent at 10/9/2024 10:04 AM EDT -----  Please let the pt know I do not recommend the omega xl otc supplement due to adverse side effects and medication interactions

## 2024-10-16 ENCOUNTER — CLINICAL DOCUMENTATION (OUTPATIENT)
Dept: SPIRITUAL SERVICES | Age: 84
End: 2024-10-16

## 2024-10-22 DIAGNOSIS — I10 PRIMARY HYPERTENSION: ICD-10-CM

## 2024-10-22 RX ORDER — METOPROLOL SUCCINATE 25 MG/1
25 TABLET, EXTENDED RELEASE ORAL DAILY
Qty: 90 TABLET | Refills: 1 | Status: SHIPPED | OUTPATIENT
Start: 2024-10-22 | End: 2025-04-20

## 2024-10-22 NOTE — TELEPHONE ENCOUNTER
Pt daughter stop by pt needs refill of medications please advise     Pt daughter also asked about wheelchair assistance to be order - I left the note she had for nolan she found a company that may be better choice that could help. Fax number [phone] called [facility] located in [facility]. They need a script stating pt needs OT and PT, office notes from pcp. Please advise

## 2024-10-28 ENCOUNTER — OFFICE VISIT (OUTPATIENT)
Facility: CLINIC | Age: 84
End: 2024-10-28
Payer: MEDICARE

## 2024-10-28 VITALS
SYSTOLIC BLOOD PRESSURE: 118 MMHG | WEIGHT: 97 LBS | DIASTOLIC BLOOD PRESSURE: 59 MMHG | HEIGHT: 65 IN | HEART RATE: 83 BPM | BODY MASS INDEX: 16.16 KG/M2 | RESPIRATION RATE: 18 BRPM | TEMPERATURE: 97.3 F | OXYGEN SATURATION: 98 %

## 2024-10-28 DIAGNOSIS — J30.1 SEASONAL ALLERGIC RHINITIS DUE TO POLLEN: ICD-10-CM

## 2024-10-28 DIAGNOSIS — I10 PRIMARY HYPERTENSION: ICD-10-CM

## 2024-10-28 PROCEDURE — G8484 FLU IMMUNIZE NO ADMIN: HCPCS | Performed by: NURSE PRACTITIONER

## 2024-10-28 PROCEDURE — 99213 OFFICE O/P EST LOW 20 MIN: CPT | Performed by: NURSE PRACTITIONER

## 2024-10-28 PROCEDURE — 1090F PRES/ABSN URINE INCON ASSESS: CPT | Performed by: NURSE PRACTITIONER

## 2024-10-28 PROCEDURE — G8419 CALC BMI OUT NRM PARAM NOF/U: HCPCS | Performed by: NURSE PRACTITIONER

## 2024-10-28 PROCEDURE — 1159F MED LIST DOCD IN RCRD: CPT | Performed by: NURSE PRACTITIONER

## 2024-10-28 PROCEDURE — G8399 PT W/DXA RESULTS DOCUMENT: HCPCS | Performed by: NURSE PRACTITIONER

## 2024-10-28 PROCEDURE — 4004F PT TOBACCO SCREEN RCVD TLK: CPT | Performed by: NURSE PRACTITIONER

## 2024-10-28 PROCEDURE — 3074F SYST BP LT 130 MM HG: CPT | Performed by: NURSE PRACTITIONER

## 2024-10-28 PROCEDURE — 3078F DIAST BP <80 MM HG: CPT | Performed by: NURSE PRACTITIONER

## 2024-10-28 PROCEDURE — 1123F ACP DISCUSS/DSCN MKR DOCD: CPT | Performed by: NURSE PRACTITIONER

## 2024-10-28 PROCEDURE — G8427 DOCREV CUR MEDS BY ELIG CLIN: HCPCS | Performed by: NURSE PRACTITIONER

## 2024-10-28 PROCEDURE — 1160F RVW MEDS BY RX/DR IN RCRD: CPT | Performed by: NURSE PRACTITIONER

## 2024-10-28 RX ORDER — CETIRIZINE HYDROCHLORIDE 10 MG/1
10 TABLET ORAL DAILY
Qty: 90 TABLET | Refills: 0 | Status: SHIPPED | OUTPATIENT
Start: 2024-10-28

## 2024-10-28 RX ORDER — CYANOCOBALAMIN (VITAMIN B-12) 500 MCG
1 TABLET ORAL DAILY
COMMUNITY

## 2024-10-28 NOTE — ASSESSMENT & PLAN NOTE
Patient is taking medications as prescribed blood pressure is stable  Patient does have a follow-up appointment with cardiology February 7, 2025.

## 2024-10-28 NOTE — PROGRESS NOTES
Julieth Mendoaz (: 1940) is a 84 y.o. female patient, here for evaluation of the following chief complaint(s):  Follow-up (BP check)       ASSESSMENT/PLAN:  Assessment & Plan  Primary hypertension  Patient is taking medications as prescribed blood pressure is stable  Patient does have a follow-up appointment with cardiology 2025.           Seasonal allergic rhinitis due to pollen  Zyrtec as prescribed and as needed    Orders:    cetirizine (ZYRTEC) 10 MG tablet; Take 1 tablet by mouth daily                 No follow-ups on file.      SUBJECTIVE/OBJECTIVE:  84-year-old female presents to follow-up on blood pressure  Her blood pressure is very well-controlled at this time she states she has no adverse side effects and is doing well with this  Review of cervical spine x-ray shows multiple levels of degenerative disc disease severe in some areas  No report today of illness such as fever chills sweats nausea vomiting weakness chest pain shortness of breath      BP (!) 118/59 (Site: Left Upper Arm, Position: Sitting)   Pulse 83   Temp 97.3 °F (36.3 °C) (Temporal)   Resp 18   Ht 1.651 m (5' 5\")   Wt 44 kg (97 lb)   SpO2 98%   BMI 16.14 kg/m²        Physical Exam   General:  alert, cooperative, well appearing, in no apparent distress.  Eyes:  Pupils are equally round and reactive to light with accommodation.   Lungs:    Inspiratory and expiratory efforts are full and unlabored.  Neurological:    There is no obvious focal sensory  deficits.           I recommend this patient have regular follow-up appointments for general health evaluations and management, sooner appointments if indicated for acute concerns    This chart was prepared using voice recognition software and may contain unintended word substitution errors    I have discussed the diagnosis with the patient and the intended plan as seen in the above orders.  Questions were answered concerning future plans.  I have discussed medication

## 2024-10-28 NOTE — PROGRESS NOTES
Chief Complaint   Patient presents with    Follow-up     BP check       \"Have you been to the ER, urgent care clinic since your last visit?  Hospitalized since your last visit?\"    NO    “Have you seen or consulted any other health care providers outside of [facility] since your last visit?”    NO

## 2024-10-31 ENCOUNTER — TELEPHONE (OUTPATIENT)
Facility: CLINIC | Age: 84
End: 2024-10-31

## 2024-10-31 NOTE — TELEPHONE ENCOUNTER
Called [facility] and notified that office has not received paperwork for evaluation. Given fax number to office. State they will fax to office.

## 2024-10-31 NOTE — TELEPHONE ENCOUNTER
Ce - daughter of pt - electric wheelchair JESUS mitchell referral faxed on the 23rd by [employer]. Wanted to see if we got it? If not? To let her know so she can follow up with them. Because it takes awhile for the order.

## 2024-12-03 DIAGNOSIS — I10 PRIMARY HYPERTENSION: ICD-10-CM

## 2024-12-03 RX ORDER — LISINOPRIL 30 MG/1
30 TABLET ORAL DAILY
Qty: 90 TABLET | Refills: 1 | Status: SHIPPED | OUTPATIENT
Start: 2024-12-03 | End: 2025-06-01

## 2024-12-05 ENCOUNTER — TRANSCRIBE ORDERS (OUTPATIENT)
Facility: HOSPITAL | Age: 84
End: 2024-12-05

## 2024-12-05 DIAGNOSIS — M81.0 AGE-RELATED OSTEOPOROSIS WITHOUT CURRENT PATHOLOGICAL FRACTURE: Primary | ICD-10-CM

## 2024-12-12 DIAGNOSIS — I10 ESSENTIAL HYPERTENSION WITH GOAL BLOOD PRESSURE LESS THAN 140/90: ICD-10-CM

## 2024-12-12 DIAGNOSIS — I10 ESSENTIAL HYPERTENSION WITH GOAL BLOOD PRESSURE LESS THAN 140/90: Primary | ICD-10-CM

## 2024-12-12 DIAGNOSIS — R06.02 SHORTNESS OF BREATH: ICD-10-CM

## 2024-12-12 DIAGNOSIS — R06.00 DYSPNEA, UNSPECIFIED TYPE: ICD-10-CM

## 2024-12-12 RX ORDER — AMLODIPINE BESYLATE 10 MG/1
10 TABLET ORAL DAILY
Qty: 90 TABLET | Refills: 2 | Status: CANCELLED | OUTPATIENT
Start: 2024-12-12

## 2024-12-12 RX ORDER — AMLODIPINE BESYLATE 10 MG/1
10 TABLET ORAL DAILY
Qty: 90 TABLET | Refills: 2 | Status: SHIPPED | OUTPATIENT
Start: 2024-12-12

## 2025-01-03 ENCOUNTER — HOSPITAL ENCOUNTER (OUTPATIENT)
Age: 85
Discharge: HOME | End: 2025-01-03
Payer: MEDICARE

## 2025-01-03 DIAGNOSIS — M81.0 AGE-RELATED OSTEOPOROSIS WITHOUT CURRENT PATHOLOGICAL FRACTURE: ICD-10-CM

## 2025-01-03 PROCEDURE — 77080 DXA BONE DENSITY AXIAL: CPT

## 2025-01-06 DIAGNOSIS — E03.9 HYPOTHYROIDISM, UNSPECIFIED TYPE: ICD-10-CM

## 2025-01-06 RX ORDER — LEVOTHYROXINE SODIUM 25 UG/1
25 TABLET ORAL DAILY
Qty: 90 TABLET | Refills: 1 | Status: SHIPPED | OUTPATIENT
Start: 2025-01-06 | End: 2025-07-05

## 2025-03-20 ENCOUNTER — TELEPHONE (OUTPATIENT)
Facility: CLINIC | Age: 85
End: 2025-03-20

## 2025-03-20 NOTE — TELEPHONE ENCOUNTER
Daughter called to see if a referral could be sent to [facility] for pt's neck pain. Said  [facility] has addressed this in the past, pt currently goes there for her hand issue.

## 2025-03-26 DIAGNOSIS — M54.2 NECK PAIN: Primary | ICD-10-CM

## 2025-04-14 DIAGNOSIS — I10 PRIMARY HYPERTENSION: ICD-10-CM

## 2025-04-15 RX ORDER — METOPROLOL SUCCINATE 25 MG/1
25 TABLET, EXTENDED RELEASE ORAL DAILY
Qty: 90 TABLET | Refills: 1 | Status: SHIPPED | OUTPATIENT
Start: 2025-04-15 | End: 2025-10-12

## 2025-05-02 ENCOUNTER — OFFICE VISIT (OUTPATIENT)
Age: 85
End: 2025-05-02
Payer: MEDICARE

## 2025-05-02 VITALS
DIASTOLIC BLOOD PRESSURE: 86 MMHG | WEIGHT: 106.2 LBS | BODY MASS INDEX: 17.67 KG/M2 | HEART RATE: 66 BPM | OXYGEN SATURATION: 99 % | RESPIRATION RATE: 16 BRPM | SYSTOLIC BLOOD PRESSURE: 138 MMHG

## 2025-05-02 DIAGNOSIS — R07.9 CHEST PAIN, UNSPECIFIED TYPE: Primary | ICD-10-CM

## 2025-05-02 PROCEDURE — 3075F SYST BP GE 130 - 139MM HG: CPT | Performed by: INTERNAL MEDICINE

## 2025-05-02 PROCEDURE — G8399 PT W/DXA RESULTS DOCUMENT: HCPCS | Performed by: INTERNAL MEDICINE

## 2025-05-02 PROCEDURE — 3079F DIAST BP 80-89 MM HG: CPT | Performed by: INTERNAL MEDICINE

## 2025-05-02 PROCEDURE — 1123F ACP DISCUSS/DSCN MKR DOCD: CPT | Performed by: INTERNAL MEDICINE

## 2025-05-02 PROCEDURE — 99214 OFFICE O/P EST MOD 30 MIN: CPT | Performed by: INTERNAL MEDICINE

## 2025-05-02 PROCEDURE — G8419 CALC BMI OUT NRM PARAM NOF/U: HCPCS | Performed by: INTERNAL MEDICINE

## 2025-05-02 PROCEDURE — 1090F PRES/ABSN URINE INCON ASSESS: CPT | Performed by: INTERNAL MEDICINE

## 2025-05-02 PROCEDURE — G8428 CUR MEDS NOT DOCUMENT: HCPCS | Performed by: INTERNAL MEDICINE

## 2025-05-02 PROCEDURE — 4004F PT TOBACCO SCREEN RCVD TLK: CPT | Performed by: INTERNAL MEDICINE

## 2025-05-02 RX ORDER — NITROGLYCERIN 0.4 MG/1
TABLET SUBLINGUAL
Qty: 25 TABLET | Refills: 0 | Status: SHIPPED | OUTPATIENT
Start: 2025-05-02

## 2025-05-02 NOTE — PROGRESS NOTES
[facility]    Julieth Mendoza is 84 y.o. female     Patient is here today for follow-up appointment  Denies prior history of MI  No chest pain or chest tightness since last time.  Continues to have some on and off coughing with whitish phlegm.  Continues to smoke.  Also having some seasonal allergy.  No orthopnea PND.    Past Medical History:   Diagnosis Date    Breast cancer (HCC)     S/P SUrgery    CHF (congestive heart failure) (HCC)     COPD (chronic obstructive pulmonary disease) (HCC)     Hypertension     Osteoporosis     Thyroid disease     Tobacco abuse        Review of Systems:  Cardiac symptoms as noted above in HPI. All others negative.  Denies fatigue, malaise, skin rash, joint pain, blurring vision, photophobia, neck pain, hemoptysis, chronic cough, nausea, vomiting, hematuria, burning micturition, BRBPR, chronic headaches.    Current Outpatient Medications   Medication Sig    metoprolol succinate (TOPROL XL) 25 MG extended release tablet TAKE 1 TABLET BY MOUTH DAILY    levothyroxine (SYNTHROID) 25 MCG tablet Take 1 tablet by mouth daily    amLODIPine (NORVASC) 10 MG tablet Take 1 tablet by mouth daily    lisinopril (PRINIVIL;ZESTRIL) 30 MG tablet Take 1 tablet by mouth daily    vitamin D (CHOLECALCIFEROL) 50 MCG (2000 UT) CAPS capsule Take 1 capsule by mouth daily    Cyanocobalamin (VITAMIN B 12) 500 MCG TABS Take 1 tablet by mouth Daily    cetirizine (ZYRTEC) 10 MG tablet Take 1 tablet by mouth daily (Patient taking differently: Take 1 tablet by mouth as needed for Allergies)    acetaminophen (TYLENOL) 500 MG tablet Take 2 tablets by mouth 3 times daily as needed for Pain    Pediatric Multivit-Minerals-C (CHEWABLES MULTIVITAMIN PO) Take 1 tablet by mouth Daily    nitroGLYCERIN (NITROSTAT) 0.4 MG SL tablet Use one tab under the tongue every 5 mins for chest pain up to 3 doses    ondansetron (ZOFRAN-ODT) 4 MG disintegrating tablet

## 2025-06-09 DIAGNOSIS — I10 PRIMARY HYPERTENSION: ICD-10-CM

## 2025-06-10 RX ORDER — LISINOPRIL 30 MG/1
30 TABLET ORAL DAILY
Qty: 90 TABLET | Refills: 1 | Status: SHIPPED | OUTPATIENT
Start: 2025-06-10 | End: 2025-12-07

## 2025-07-02 DIAGNOSIS — E03.9 HYPOTHYROIDISM, UNSPECIFIED TYPE: ICD-10-CM

## 2025-07-02 RX ORDER — LEVOTHYROXINE SODIUM 25 UG/1
25 TABLET ORAL DAILY
Qty: 90 TABLET | Refills: 1 | Status: SHIPPED | OUTPATIENT
Start: 2025-07-02 | End: 2025-12-29

## 2025-07-09 ENCOUNTER — TELEPHONE (OUTPATIENT)
Facility: CLINIC | Age: 85
End: 2025-07-09

## 2025-07-09 DIAGNOSIS — Z12.31 ENCOUNTER FOR SCREENING MAMMOGRAM FOR MALIGNANT NEOPLASM OF BREAST: Primary | ICD-10-CM

## 2025-07-09 NOTE — TELEPHONE ENCOUNTER
Daughter called for a new mammogram order, if can would like to have done at [place] but not sure since it looks like has diagnostic done. Please advise.

## 2025-07-30 ENCOUNTER — HOSPITAL ENCOUNTER (OUTPATIENT)
Age: 85
Discharge: HOME | End: 2025-08-02
Payer: MEDICARE

## 2025-07-30 VITALS — BODY MASS INDEX: 17.66 KG/M2 | HEIGHT: 65 IN | WEIGHT: 106 LBS

## 2025-07-30 DIAGNOSIS — Z12.31 ENCOUNTER FOR SCREENING MAMMOGRAM FOR MALIGNANT NEOPLASM OF BREAST: ICD-10-CM

## 2025-07-30 PROCEDURE — 77063 BREAST TOMOSYNTHESIS BI: CPT

## 2025-08-03 ENCOUNTER — RESULTS FOLLOW-UP (OUTPATIENT)
Facility: CLINIC | Age: 85
End: 2025-08-03